# Patient Record
Sex: FEMALE | Race: WHITE | NOT HISPANIC OR LATINO | Employment: OTHER | URBAN - METROPOLITAN AREA
[De-identification: names, ages, dates, MRNs, and addresses within clinical notes are randomized per-mention and may not be internally consistent; named-entity substitution may affect disease eponyms.]

---

## 2023-04-20 ENCOUNTER — OFFICE VISIT (OUTPATIENT)
Dept: PRIMARY CARE | Facility: CLINIC | Age: 88
End: 2023-04-20
Payer: MEDICARE

## 2023-04-20 DIAGNOSIS — Z00.00 HEALTHCARE MAINTENANCE: ICD-10-CM

## 2023-04-20 DIAGNOSIS — I10 ESSENTIAL HYPERTENSION, BENIGN: Primary | ICD-10-CM

## 2023-04-20 PROCEDURE — UHSMG PR UH SELECT MEET AND GREET: Performed by: FAMILY MEDICINE

## 2023-04-20 RX ORDER — CHOLESTYRAMINE 4 G/9G
1 POWDER, FOR SUSPENSION ORAL 2 TIMES DAILY
COMMUNITY
Start: 2023-02-03 | End: 2023-06-08 | Stop reason: ALTCHOICE

## 2023-04-20 RX ORDER — FUROSEMIDE 20 MG/1
20 TABLET ORAL EVERY OTHER DAY
Qty: 45 TABLET | Refills: 2 | Status: SHIPPED | OUTPATIENT
Start: 2023-04-20 | End: 2023-12-07 | Stop reason: ALTCHOICE

## 2023-04-20 RX ORDER — ATENOLOL 50 MG/1
50 TABLET ORAL DAILY
Qty: 90 TABLET | Refills: 2 | Status: SHIPPED | OUTPATIENT
Start: 2023-04-20 | End: 2024-01-09 | Stop reason: SDUPTHER

## 2023-04-20 RX ORDER — LOSARTAN POTASSIUM AND HYDROCHLOROTHIAZIDE 25; 100 MG/1; MG/1
1 TABLET ORAL DAILY
COMMUNITY
Start: 2022-12-23 | End: 2023-04-20 | Stop reason: SDUPTHER

## 2023-04-20 RX ORDER — CHLORHEXIDINE GLUCONATE ORAL RINSE 1.2 MG/ML
5 SOLUTION DENTAL 2 TIMES DAILY
COMMUNITY
Start: 2022-11-04 | End: 2023-06-08 | Stop reason: ALTCHOICE

## 2023-04-20 RX ORDER — LOSARTAN POTASSIUM AND HYDROCHLOROTHIAZIDE 25; 100 MG/1; MG/1
1 TABLET ORAL DAILY
Qty: 90 TABLET | Refills: 2 | Status: SHIPPED | OUTPATIENT
Start: 2023-04-20 | End: 2024-01-09 | Stop reason: SDUPTHER

## 2023-04-20 RX ORDER — ATENOLOL 50 MG/1
50 TABLET ORAL DAILY
COMMUNITY
Start: 2022-12-23 | End: 2023-04-20 | Stop reason: SDUPTHER

## 2023-04-20 NOTE — PROGRESS NOTES
Here to discuss [facility]    Requesting refill on meds for which will be due prior to 1st visit    No allergy history

## 2023-05-25 ENCOUNTER — LAB (OUTPATIENT)
Dept: LAB | Facility: LAB | Age: 88
End: 2023-05-25
Payer: MEDICARE

## 2023-05-25 DIAGNOSIS — Z00.00 HEALTHCARE MAINTENANCE: ICD-10-CM

## 2023-05-25 LAB
ALANINE AMINOTRANSFERASE (SGPT) (U/L) IN SER/PLAS: 12 U/L (ref 7–45)
ALBUMIN (G/DL) IN SER/PLAS: 3.7 G/DL (ref 3.4–5)
ALKALINE PHOSPHATASE (U/L) IN SER/PLAS: 124 U/L (ref 33–136)
ANION GAP IN SER/PLAS: 13 MMOL/L (ref 10–20)
APPEARANCE, URINE: NORMAL
ASPARTATE AMINOTRANSFERASE (SGOT) (U/L) IN SER/PLAS: 16 U/L (ref 9–39)
BASOPHILS (10*3/UL) IN BLOOD BY AUTOMATED COUNT: 0.03 X10E9/L (ref 0–0.1)
BASOPHILS/100 LEUKOCYTES IN BLOOD BY AUTOMATED COUNT: 0.5 % (ref 0–2)
BILIRUBIN TOTAL (MG/DL) IN SER/PLAS: 0.7 MG/DL (ref 0–1.2)
BILIRUBIN, URINE: NEGATIVE
BLOOD, URINE: NEGATIVE
C REACTIVE PROTEIN (MG/L) IN SER/PLAS BY HIGH SENSIT: 0.5 MG/L
CALCIDIOL (25 OH VITAMIN D3) (NG/ML) IN SER/PLAS: 42 NG/ML
CALCIUM (MG/DL) IN SER/PLAS: 9.3 MG/DL (ref 8.6–10.6)
CARBON DIOXIDE, TOTAL (MMOL/L) IN SER/PLAS: 27 MMOL/L (ref 21–32)
CHLORIDE (MMOL/L) IN SER/PLAS: 104 MMOL/L (ref 98–107)
CHOLESTEROL (MG/DL) IN SER/PLAS: 149 MG/DL (ref 0–199)
CHOLESTEROL IN HDL (MG/DL) IN SER/PLAS: 51.7 MG/DL
CHOLESTEROL/HDL RATIO: 2.9
COLOR, URINE: YELLOW
CREATININE (MG/DL) IN SER/PLAS: 0.81 MG/DL (ref 0.5–1.05)
EOSINOPHILS (10*3/UL) IN BLOOD BY AUTOMATED COUNT: 0.38 X10E9/L (ref 0–0.4)
EOSINOPHILS/100 LEUKOCYTES IN BLOOD BY AUTOMATED COUNT: 6.7 % (ref 0–6)
ERYTHROCYTE DISTRIBUTION WIDTH (RATIO) BY AUTOMATED COUNT: 13.8 % (ref 11.5–14.5)
ERYTHROCYTE MEAN CORPUSCULAR HEMOGLOBIN CONCENTRATION (G/DL) BY AUTOMATED: 30.5 G/DL (ref 32–36)
ERYTHROCYTE MEAN CORPUSCULAR VOLUME (FL) BY AUTOMATED COUNT: 99 FL (ref 80–100)
ERYTHROCYTES (10*6/UL) IN BLOOD BY AUTOMATED COUNT: 4.42 X10E12/L (ref 4–5.2)
ESTIMATED AVERAGE GLUCOSE FOR HBA1C: 105 MG/DL
GFR FEMALE: 69 ML/MIN/1.73M2
GLUCOSE (MG/DL) IN SER/PLAS: 73 MG/DL (ref 74–99)
GLUCOSE, URINE: NEGATIVE MG/DL
HEMATOCRIT (%) IN BLOOD BY AUTOMATED COUNT: 43.9 % (ref 36–46)
HEMOGLOBIN (G/DL) IN BLOOD: 13.4 G/DL (ref 12–16)
HEMOGLOBIN A1C/HEMOGLOBIN TOTAL IN BLOOD: 5.3 %
IMMATURE GRANULOCYTES/100 LEUKOCYTES IN BLOOD BY AUTOMATED COUNT: 0.4 % (ref 0–0.9)
KETONES, URINE: NEGATIVE MG/DL
LDL: 85 MG/DL (ref 0–99)
LEUKOCYTE ESTERASE, URINE: NEGATIVE
LEUKOCYTES (10*3/UL) IN BLOOD BY AUTOMATED COUNT: 5.7 X10E9/L (ref 4.4–11.3)
LYMPHOCYTES (10*3/UL) IN BLOOD BY AUTOMATED COUNT: 1.6 X10E9/L (ref 0.8–3)
LYMPHOCYTES/100 LEUKOCYTES IN BLOOD BY AUTOMATED COUNT: 28.2 % (ref 13–44)
MONOCYTES (10*3/UL) IN BLOOD BY AUTOMATED COUNT: 0.68 X10E9/L (ref 0.05–0.8)
MONOCYTES/100 LEUKOCYTES IN BLOOD BY AUTOMATED COUNT: 12 % (ref 2–10)
NEUTROPHILS (10*3/UL) IN BLOOD BY AUTOMATED COUNT: 2.96 X10E9/L (ref 1.6–5.5)
NEUTROPHILS/100 LEUKOCYTES IN BLOOD BY AUTOMATED COUNT: 52.2 % (ref 40–80)
NITRITE, URINE: NEGATIVE
NRBC (PER 100 WBCS) BY AUTOMATED COUNT: 0 /100 WBC (ref 0–0)
PH, URINE: 6 (ref 5–8)
PLATELETS (10*3/UL) IN BLOOD AUTOMATED COUNT: 201 X10E9/L (ref 150–450)
POTASSIUM (MMOL/L) IN SER/PLAS: 3.7 MMOL/L (ref 3.5–5.3)
PROTEIN TOTAL: 6.5 G/DL (ref 6.4–8.2)
PROTEIN, URINE: NEGATIVE MG/DL
SODIUM (MMOL/L) IN SER/PLAS: 140 MMOL/L (ref 136–145)
SPECIFIC GRAVITY, URINE: 1.03 (ref 1–1.03)
THYROTROPIN (MIU/L) IN SER/PLAS BY DETECTION LIMIT <= 0.05 MIU/L: 2.26 MIU/L (ref 0.44–3.98)
TRIGLYCERIDE (MG/DL) IN SER/PLAS: 62 MG/DL (ref 0–149)
UREA NITROGEN (MG/DL) IN SER/PLAS: 23 MG/DL (ref 6–23)
UROBILINOGEN, URINE: <2 MG/DL (ref 0–1.9)
VLDL: 12 MG/DL (ref 0–40)

## 2023-05-25 PROCEDURE — 83036 HEMOGLOBIN GLYCOSYLATED A1C: CPT

## 2023-05-25 PROCEDURE — 86141 C-REACTIVE PROTEIN HS: CPT

## 2023-05-25 PROCEDURE — 84443 ASSAY THYROID STIM HORMONE: CPT

## 2023-05-25 PROCEDURE — 82306 VITAMIN D 25 HYDROXY: CPT

## 2023-05-25 PROCEDURE — 85025 COMPLETE CBC W/AUTO DIFF WBC: CPT

## 2023-05-25 PROCEDURE — 36415 COLL VENOUS BLD VENIPUNCTURE: CPT

## 2023-05-25 PROCEDURE — 80061 LIPID PANEL: CPT

## 2023-05-25 PROCEDURE — 80053 COMPREHEN METABOLIC PANEL: CPT

## 2023-05-25 PROCEDURE — 81003 URINALYSIS AUTO W/O SCOPE: CPT

## 2023-06-08 ENCOUNTER — OFFICE VISIT (OUTPATIENT)
Dept: PRIMARY CARE | Facility: CLINIC | Age: 88
End: 2023-06-08
Payer: MEDICARE

## 2023-06-08 VITALS
BODY MASS INDEX: 32.05 KG/M2 | DIASTOLIC BLOOD PRESSURE: 46 MMHG | WEIGHT: 204.2 LBS | TEMPERATURE: 98.3 F | HEIGHT: 67 IN | SYSTOLIC BLOOD PRESSURE: 92 MMHG | HEART RATE: 85 BPM | OXYGEN SATURATION: 90 %

## 2023-06-08 DIAGNOSIS — Z12.31 BREAST CANCER SCREENING BY MAMMOGRAM: ICD-10-CM

## 2023-06-08 DIAGNOSIS — K63.8219 SMALL INTESTINAL BACTERIAL OVERGROWTH (SIBO): ICD-10-CM

## 2023-06-08 DIAGNOSIS — I10 ESSENTIAL HYPERTENSION, BENIGN: ICD-10-CM

## 2023-06-08 DIAGNOSIS — Z00.01 ENCOUNTER FOR GENERAL ADULT MEDICAL EXAMINATION WITH ABNORMAL FINDINGS: Primary | ICD-10-CM

## 2023-06-08 PROCEDURE — 1036F TOBACCO NON-USER: CPT | Performed by: FAMILY MEDICINE

## 2023-06-08 PROCEDURE — 3078F DIAST BP <80 MM HG: CPT | Performed by: FAMILY MEDICINE

## 2023-06-08 PROCEDURE — 1159F MED LIST DOCD IN RCRD: CPT | Performed by: FAMILY MEDICINE

## 2023-06-08 PROCEDURE — 3074F SYST BP LT 130 MM HG: CPT | Performed by: FAMILY MEDICINE

## 2023-06-08 PROCEDURE — UHSPHYS PR UH SELECT PHYSICAL: Performed by: FAMILY MEDICINE

## 2023-06-08 PROCEDURE — 1126F AMNT PAIN NOTED NONE PRSNT: CPT | Performed by: FAMILY MEDICINE

## 2023-06-08 RX ORDER — ESOMEPRAZOLE MAGNESIUM 20 MG/1
1 TABLET, DELAYED RELEASE ORAL DAILY
COMMUNITY

## 2023-06-08 RX ORDER — MULTIVITAMIN
1 TABLET ORAL
COMMUNITY
Start: 2011-06-22 | End: 2024-01-09 | Stop reason: ALTCHOICE

## 2023-06-08 ASSESSMENT — PATIENT HEALTH QUESTIONNAIRE - PHQ9
1. LITTLE INTEREST OR PLEASURE IN DOING THINGS: NOT AT ALL
2. FEELING DOWN, DEPRESSED OR HOPELESS: NOT AT ALL
SUM OF ALL RESPONSES TO PHQ9 QUESTIONS 1 & 2: 0

## 2023-06-08 ASSESSMENT — ENCOUNTER SYMPTOMS
OCCASIONAL FEELINGS OF UNSTEADINESS: 0
LOSS OF SENSATION IN FEET: 0
DEPRESSION: 0

## 2023-06-08 ASSESSMENT — PAIN SCALES - GENERAL: PAINLEVEL: 0-NO PAIN

## 2023-06-08 ASSESSMENT — LIFESTYLE VARIABLES
SKIP TO QUESTIONS 9-10: 1
HOW OFTEN DO YOU HAVE SIX OR MORE DRINKS ON ONE OCCASION: NEVER
HOW OFTEN DO YOU HAVE A DRINK CONTAINING ALCOHOL: 2-4 TIMES A MONTH
HOW MANY STANDARD DRINKS CONTAINING ALCOHOL DO YOU HAVE ON A TYPICAL DAY: 1 OR 2
AUDIT-C TOTAL SCORE: 2

## 2023-06-09 ENCOUNTER — TELEPHONE (OUTPATIENT)
Dept: PRIMARY CARE | Facility: CLINIC | Age: 88
End: 2023-06-09
Payer: MEDICARE

## 2023-06-09 NOTE — TELEPHONE ENCOUNTER
Pt is calling in regards to getting Mamm but [facility] doesn't take the kind that your ordered and she is wondering if she should get it at [facility] or you would like her to have the one that you ordered at [facility]

## 2023-07-14 ENCOUNTER — TELEPHONE (OUTPATIENT)
Dept: PRIMARY CARE | Facility: CLINIC | Age: 88
End: 2023-07-14
Payer: MEDICARE

## 2023-07-14 NOTE — TELEPHONE ENCOUNTER
Had done breath test for GI issues - was tried on metronidazole and had rash that was covering her body from head to toe as noted - not bothering her as much since Tuesday - no fever / feels fine - the lesions do yair - will have her try OTC zyrtec every day and consider BID if need be

## 2023-07-14 NOTE — TELEPHONE ENCOUNTER
Patient c/o widespread rash  Little red dots starting on the tops of her feet to her face  Mild itching  Denies pain, burning, blistering, bleeding  Denies warmth  Denies any new foods, soaps, detergents or lotion  Denies fever

## 2023-09-07 ENCOUNTER — OFFICE VISIT (OUTPATIENT)
Dept: PRIMARY CARE | Facility: CLINIC | Age: 88
End: 2023-09-07
Payer: MEDICARE

## 2023-09-07 VITALS
SYSTOLIC BLOOD PRESSURE: 118 MMHG | OXYGEN SATURATION: 95 % | HEART RATE: 73 BPM | BODY MASS INDEX: 32.27 KG/M2 | TEMPERATURE: 98 F | DIASTOLIC BLOOD PRESSURE: 48 MMHG | WEIGHT: 204.4 LBS

## 2023-09-07 DIAGNOSIS — G47.9 SLEEP DISTURBANCE: ICD-10-CM

## 2023-09-07 DIAGNOSIS — Z23 FLU VACCINE NEED: ICD-10-CM

## 2023-09-07 DIAGNOSIS — I10 ESSENTIAL HYPERTENSION, BENIGN: Primary | ICD-10-CM

## 2023-09-07 DIAGNOSIS — R19.7 DIARRHEA, UNSPECIFIED TYPE: ICD-10-CM

## 2023-09-07 PROCEDURE — 3078F DIAST BP <80 MM HG: CPT | Performed by: FAMILY MEDICINE

## 2023-09-07 PROCEDURE — 99214 OFFICE O/P EST MOD 30 MIN: CPT | Performed by: FAMILY MEDICINE

## 2023-09-07 PROCEDURE — 90662 IIV NO PRSV INCREASED AG IM: CPT | Performed by: FAMILY MEDICINE

## 2023-09-07 PROCEDURE — 1126F AMNT PAIN NOTED NONE PRSNT: CPT | Performed by: FAMILY MEDICINE

## 2023-09-07 PROCEDURE — 1036F TOBACCO NON-USER: CPT | Performed by: FAMILY MEDICINE

## 2023-09-07 PROCEDURE — 3074F SYST BP LT 130 MM HG: CPT | Performed by: FAMILY MEDICINE

## 2023-09-07 PROCEDURE — 1159F MED LIST DOCD IN RCRD: CPT | Performed by: FAMILY MEDICINE

## 2023-09-07 PROCEDURE — G0008 ADMIN INFLUENZA VIRUS VAC: HCPCS | Performed by: FAMILY MEDICINE

## 2023-09-07 RX ORDER — DOXYCYCLINE 100 MG/1
100 CAPSULE ORAL 2 TIMES DAILY
COMMUNITY
Start: 2023-09-06 | End: 2023-12-07 | Stop reason: ALTCHOICE

## 2023-09-07 ASSESSMENT — PAIN SCALES - GENERAL: PAINLEVEL: 0-NO PAIN

## 2023-09-07 NOTE — PROGRESS NOTES
"Subjective   Patient ID: Karyn Bliss is a 89 y.o. female who presents for Blood Pressure Check.  HPI  1) Here to follow-up hypertension - denies chest pain, shortness of breath, dizziness, lightheadedness, hand or foot swelling that is new or different.  Taking and tolerating medications well -she is no longer taking a diuretic    2) ongoing issues with diarrhea/looser stool  Is working with Dr. Baum on this  Did have a positive \"intolerance test\"  She is now going to do a trial of doxycycline, had been on metronidazole which was really not agreeing with her  Was suggested she could get by with just taking Imodium   Does not report any fever or blood in stool or dark stool    3) requests disability placard letter    4) sleep issues  Does take a acetaminophen/diphenhydramine product (500/25) which she finds helps get her through the night better  Does not necessarily help her fall asleep  Did not report any daytime fatigue or other complications/problems    Review of Systems  Essentially noncontributory otherwise      Objective   BP (!) 118/48 (BP Location: Left arm, Patient Position: Sitting, BP Cuff Size: Large adult)   Pulse 73   Temp 36.7 °C (98 °F) (Temporal)   Wt 92.7 kg (204 lb 6.4 oz)   SpO2 95%   BMI 32.27 kg/m²     Physical Exam  General: No acute distress, appears well  HEENT: No scleral icterus or conjunctival injection  Neck: No gross JVD or bruits appreciated  Cardiac: Regular rate and rhythm, normal S1 and S2 without S3-S4 or murmur  Lungs: Clear to auscultation bilaterally with good air exchange  Abdomen: Normal active bowel sounds, no marked tenderness to touch, no HSM  Extremities: Appear well-perfused without significant edema  Neuro: Ambulates well without assistance device  Assessment/Plan   Problem List Items Addressed This Visit    None  Visit Diagnoses       Essential hypertension, benign    -  Primary    Diarrhea, unspecified type        Sleep disturbance            1) doing " generally well, continue current medications/supplements    2) she questions about vitamin D, after further discussion regarding more correlated than causative findings when studied she will hold off on taking for now     3) given letter for disability placard    4) prefers keeping a close eye on things and we will see her in 3 months

## 2023-09-16 NOTE — PROGRESS NOTES
"Subjective   Patient ID: Karyn Bliss is a 89 y.o. female who presents for Annual Exam ([facility]).  HPI  1) Has had some bowel issues that improved since stopping dairy  Had done some breath testing through GI then was tried on metronidazole for apparent SIBO -then developed significant rash all over and had to stop  No blood in stool or dark tarry stool    2) would like order for mammogram    3) Here to follow-up hypertension - denies chest pain, shortness of breath, dizziness, lightheadedness, hand or foot swelling that is new or different.  Taking and tolerating medications well.      Recent lab profile very favorable      Review of Systems  Essentially noncontributory otherwise    Objective   BP (!) 92/46 (BP Location: Left arm, Patient Position: Sitting, BP Cuff Size: Adult)   Pulse 85   Temp 36.8 °C (98.3 °F) (Temporal)   Ht 1.695 m (5' 6.73\")   Wt 92.6 kg (204 lb 3.2 oz)   SpO2 90%   BMI 32.24 kg/m²       Physical Exam  Vitals and nursing note reviewed.   Constitutional:       General: She is not in acute distress.     Appearance: She is not ill-appearing.   HENT:      Head: Normocephalic and atraumatic.      Mouth/Throat:      Pharynx: No posterior oropharyngeal erythema.   Eyes:      General: No scleral icterus.     Extraocular Movements: Extraocular movements intact.      Pupils: Pupils are equal, round, and reactive to light.   Cardiovascular:      Rate and Rhythm: Normal rate and regular rhythm.      Heart sounds: No murmur heard.  Pulmonary:      Breath sounds: Normal breath sounds. No wheezing or rhonchi.   Abdominal:      General: There is no distension.      Palpations: Abdomen is soft.      Tenderness: There is no abdominal tenderness.   Musculoskeletal:         General: Normal range of motion.      Cervical back: Normal range of motion.      Right lower leg: No edema.      Left lower leg: No edema.   Lymphadenopathy:      Cervical: No cervical adenopathy.   Skin:     General: Skin is " warm and dry.   Neurological:      Mental Status: She is alert and oriented to person, place, and time. Mental status is at baseline.      Motor: No weakness.      Coordination: Coordination normal.      Deep Tendon Reflexes: Reflexes normal.   Psychiatric:         Mood and Affect: Mood normal.         Behavior: Behavior normal.         Thought Content: Thought content normal.         Judgment: Judgment normal.         Assessment/Plan   Problem List Items Addressed This Visit       Small intestinal bacterial overgrowth (SIBO)    Essential hypertension, benign     Other Visit Diagnoses       Encounter for general adult medical examination with abnormal findings    -  Primary    Breast cancer screening by mammogram            Doing very well overall - plans to do q3m check ins

## 2023-09-17 PROBLEM — I10 ESSENTIAL HYPERTENSION, BENIGN: Status: ACTIVE | Noted: 2023-09-17

## 2023-09-17 PROBLEM — K63.8219 SMALL INTESTINAL BACTERIAL OVERGROWTH (SIBO): Status: ACTIVE | Noted: 2023-09-17

## 2023-10-27 ENCOUNTER — TELEPHONE (OUTPATIENT)
Dept: GASTROENTEROLOGY | Facility: CLINIC | Age: 88
End: 2023-10-27
Payer: MEDICARE

## 2023-10-27 DIAGNOSIS — K63.8219 SMALL INTESTINAL BACTERIAL OVERGROWTH (SIBO): Primary | ICD-10-CM

## 2023-10-27 RX ORDER — CEPHALEXIN 500 MG/1
500 CAPSULE ORAL 2 TIMES DAILY
Qty: 14 CAPSULE | Refills: 0 | Status: SHIPPED | OUTPATIENT
Start: 2023-10-27 | End: 2023-11-03

## 2023-10-27 NOTE — TELEPHONE ENCOUNTER
Pt had been feeling better but this morning she experienced explosive diarrhea.  She would like to speak with you.

## 2023-10-27 NOTE — TELEPHONE ENCOUNTER
For a month after taking Bactrim for a week she was fine and then this morning had the kind of explosive diarrhea she was having previously.    We will give Keflex for 1 week.  She will let me know.    May need to consider regular rotating antibiotics.

## 2023-12-07 ENCOUNTER — OFFICE VISIT (OUTPATIENT)
Dept: PRIMARY CARE | Facility: CLINIC | Age: 88
End: 2023-12-07
Payer: MEDICARE

## 2023-12-07 VITALS
SYSTOLIC BLOOD PRESSURE: 113 MMHG | OXYGEN SATURATION: 99 % | TEMPERATURE: 97.9 F | HEART RATE: 71 BPM | DIASTOLIC BLOOD PRESSURE: 54 MMHG

## 2023-12-07 DIAGNOSIS — I10 ESSENTIAL HYPERTENSION, BENIGN: Primary | ICD-10-CM

## 2023-12-07 PROCEDURE — 1159F MED LIST DOCD IN RCRD: CPT | Performed by: FAMILY MEDICINE

## 2023-12-07 PROCEDURE — 1126F AMNT PAIN NOTED NONE PRSNT: CPT | Performed by: FAMILY MEDICINE

## 2023-12-07 PROCEDURE — 1036F TOBACCO NON-USER: CPT | Performed by: FAMILY MEDICINE

## 2023-12-07 PROCEDURE — 3078F DIAST BP <80 MM HG: CPT | Performed by: FAMILY MEDICINE

## 2023-12-07 PROCEDURE — 1160F RVW MEDS BY RX/DR IN RCRD: CPT | Performed by: FAMILY MEDICINE

## 2023-12-07 PROCEDURE — 3074F SYST BP LT 130 MM HG: CPT | Performed by: FAMILY MEDICINE

## 2023-12-07 PROCEDURE — 99214 OFFICE O/P EST MOD 30 MIN: CPT | Performed by: FAMILY MEDICINE

## 2023-12-07 ASSESSMENT — PAIN SCALES - GENERAL: PAINLEVEL: 0-NO PAIN

## 2023-12-07 NOTE — PROGRESS NOTES
Subjective   Patient ID: Karyn Bliss is a 89 y.o. female who presents for Follow-up (3 mo FUV - HTN).  HPI  1) Here to follow-up hypertension - denies chest pain, shortness of breath, dizziness, lightheadedness, hand or foot swelling that is new or different.  Taking and tolerating medications well.  Doing well with physical activity. Bps low today - no hypotensive sxs.      Review of Systems  Essentially noncontributory otherwise    Objective   /54   Pulse 71   Temp 36.6 °C (97.9 °F) (Temporal)   SpO2 99%     Physical Exam  General: No acute distress, appears at baseline  Neck: No gross JVD or thyromegaly  Cardiac: Regular rhythm, normal S1 and S2 without S3 or S4 but 2 out of 6 systolic ejection murmur heard best at left sternal border  Lungs: Clear to auscultation bilaterally  Extremities: Appear well-perfused - slight nonpitting edema left ankle > right  Assessment/Plan   Problem List Items Addressed This Visit       Essential hypertension, benign - Primary   Given low BP - will reduce Atenolol to 25 mg - follow-up next month to reassess

## 2024-01-09 ENCOUNTER — OFFICE VISIT (OUTPATIENT)
Dept: PRIMARY CARE | Facility: CLINIC | Age: 89
End: 2024-01-09
Payer: MEDICARE

## 2024-01-09 VITALS
BODY MASS INDEX: 32.84 KG/M2 | TEMPERATURE: 98.5 F | SYSTOLIC BLOOD PRESSURE: 113 MMHG | HEART RATE: 65 BPM | OXYGEN SATURATION: 96 % | WEIGHT: 208 LBS | DIASTOLIC BLOOD PRESSURE: 74 MMHG

## 2024-01-09 DIAGNOSIS — R25.2 MUSCLE CRAMPS: ICD-10-CM

## 2024-01-09 DIAGNOSIS — K63.8219 SMALL INTESTINAL BACTERIAL OVERGROWTH (SIBO): ICD-10-CM

## 2024-01-09 DIAGNOSIS — I10 ESSENTIAL HYPERTENSION, BENIGN: Primary | ICD-10-CM

## 2024-01-09 PROCEDURE — 3074F SYST BP LT 130 MM HG: CPT | Performed by: FAMILY MEDICINE

## 2024-01-09 PROCEDURE — 1126F AMNT PAIN NOTED NONE PRSNT: CPT | Performed by: FAMILY MEDICINE

## 2024-01-09 PROCEDURE — 1159F MED LIST DOCD IN RCRD: CPT | Performed by: FAMILY MEDICINE

## 2024-01-09 PROCEDURE — 1036F TOBACCO NON-USER: CPT | Performed by: FAMILY MEDICINE

## 2024-01-09 PROCEDURE — 3078F DIAST BP <80 MM HG: CPT | Performed by: FAMILY MEDICINE

## 2024-01-09 PROCEDURE — 99215 OFFICE O/P EST HI 40 MIN: CPT | Performed by: FAMILY MEDICINE

## 2024-01-09 RX ORDER — ATENOLOL 25 MG/1
25 TABLET ORAL DAILY
Qty: 90 TABLET | Refills: 3 | Status: SHIPPED | OUTPATIENT
Start: 2024-01-09

## 2024-01-09 RX ORDER — FUROSEMIDE 20 MG/1
20 TABLET ORAL DAILY PRN
Qty: 90 TABLET | Refills: 1 | COMMUNITY
Start: 2024-01-09

## 2024-01-09 RX ORDER — LOSARTAN POTASSIUM AND HYDROCHLOROTHIAZIDE 25; 100 MG/1; MG/1
1 TABLET ORAL DAILY
Qty: 90 TABLET | Refills: 3 | Status: SHIPPED | OUTPATIENT
Start: 2024-01-09

## 2024-01-09 ASSESSMENT — PAIN SCALES - GENERAL: PAINLEVEL: 0-NO PAIN

## 2024-01-09 NOTE — PROGRESS NOTES
Subjective   Patient ID: Karyn Bliss is a 89 y.o. female who presents for Hypertension (FUV).  HPI  Here to follow-up hypertension - denies chest pain, shortness of breath, dizziness, lightheadedness, hand or foot swelling that is new or different.  Taking and tolerating medications well.       Still getting abdominal pain and diarrhea issues - followed by Dr Baum - had positive breath test (SIBO) - trying different meds     Getting hand and leg cramps - ~1x/week - variable - no clear precipitant - more problematic when in thigh, can make hard to stand -     Review of Systems  Essentially noncontributory otherwise    Objective   /74 (BP Location: Left arm)   Pulse 68   Temp 36.9 °C (98.5 °F) (Temporal)   Wt 94.3 kg (208 lb)   SpO2 96%   BMI 32.84 kg/m²     Physical Exam  General: No acute distress, appears at baseline  Lungs: Clear to auscultation bilaterally no wheeze or crackles  Cardiac: Regular rate and rhythm, normal S1-S2 without S3-S4  Extremities: Appear well-perfused, no marked lower extremity edema, no obvious crating issues at present  Assessment/Plan   Problem List Items Addressed This Visit       Small intestinal bacterial overgrowth (SIBO)    Essential hypertension, benign - Primary     Other Visit Diagnoses       Muscle cramps            1) HTN - stable - cpm - will et back to me about meds needed    2) Muscle cramps - sent email re. [employer] Medi-Lyte Heat Stress Relief (Ca++, Mg++, K+)  and suggest can try daily bit at least on days when takes diuretic     3) SIBO - cont to follow w/ GI     TVT of 44 minutes with greater than 50% spent FTF in assessment/discussion and care coordination

## 2024-01-17 ENCOUNTER — TELEPHONE (OUTPATIENT)
Dept: GASTROENTEROLOGY | Facility: CLINIC | Age: 89
End: 2024-01-17
Payer: MEDICARE

## 2024-01-17 NOTE — TELEPHONE ENCOUNTER
Mrs. Bliss would like to speak with you concerning her diarrhea and the medication, she has been taking for it.

## 2024-01-17 NOTE — TELEPHONE ENCOUNTER
"Last round of antibiotics did not really help her.  She will usually have a formed stool then a softer stool and sometimes loose stool in the morning and less she takes an Imodium after the first stool in which case it will usually stop.  When she is going out she takes a second Imodium \"just to be safe\" and then is okay but she sometimes has no bowel movement the following day.  This has been an effective regimen for her.  In view of her lack of response to the last couple antibiotic trials with her ongoing SIBO, told her that if she is comfortable with the lifestyle management the way she is currently doing it with Imodium I think that is a reasonable long-term solution and preferable to repeated courses of antibiotics which have had a diminishing benefit.  "

## 2024-02-01 DIAGNOSIS — Z00.00 HEALTHCARE MAINTENANCE: ICD-10-CM

## 2024-02-14 ENCOUNTER — TELEPHONE (OUTPATIENT)
Dept: PRIMARY CARE | Facility: CLINIC | Age: 89
End: 2024-02-14
Payer: MEDICARE

## 2024-02-14 NOTE — TELEPHONE ENCOUNTER
Patient notes that she was feeling quite dizzy for a few hours last night.   She states that you have been adjusting her BP meds and she thinks it is related.   The dizziness has resolved, however she is concerned.  She would like to speak with you.

## 2024-02-29 NOTE — TELEPHONE ENCOUNTER
"Called to see if any other episodes - none noted   Her \"dizziness\" spell was really more so vertigo - would follow - enc to reach out to us if it happens again - consider home BPPV exercises     "

## 2024-04-09 ENCOUNTER — OFFICE VISIT (OUTPATIENT)
Dept: PRIMARY CARE | Facility: CLINIC | Age: 89
End: 2024-04-09
Payer: MEDICARE

## 2024-04-09 VITALS
TEMPERATURE: 98.2 F | WEIGHT: 212.2 LBS | HEART RATE: 80 BPM | SYSTOLIC BLOOD PRESSURE: 123 MMHG | BODY MASS INDEX: 33.3 KG/M2 | OXYGEN SATURATION: 96 % | HEIGHT: 67 IN | DIASTOLIC BLOOD PRESSURE: 60 MMHG

## 2024-04-09 DIAGNOSIS — M79.673 PAIN OF FOOT, UNSPECIFIED LATERALITY: ICD-10-CM

## 2024-04-09 DIAGNOSIS — I10 ESSENTIAL HYPERTENSION, BENIGN: Primary | ICD-10-CM

## 2024-04-09 DIAGNOSIS — R25.2 MUSCLE CRAMPS: ICD-10-CM

## 2024-04-09 DIAGNOSIS — R19.7 DIARRHEA, UNSPECIFIED TYPE: ICD-10-CM

## 2024-04-09 PROCEDURE — 3074F SYST BP LT 130 MM HG: CPT | Performed by: FAMILY MEDICINE

## 2024-04-09 PROCEDURE — 1126F AMNT PAIN NOTED NONE PRSNT: CPT | Performed by: FAMILY MEDICINE

## 2024-04-09 PROCEDURE — 99215 OFFICE O/P EST HI 40 MIN: CPT | Performed by: FAMILY MEDICINE

## 2024-04-09 PROCEDURE — 1036F TOBACCO NON-USER: CPT | Performed by: FAMILY MEDICINE

## 2024-04-09 PROCEDURE — 3078F DIAST BP <80 MM HG: CPT | Performed by: FAMILY MEDICINE

## 2024-04-09 PROCEDURE — 1159F MED LIST DOCD IN RCRD: CPT | Performed by: FAMILY MEDICINE

## 2024-04-09 ASSESSMENT — PAIN SCALES - GENERAL: PAINLEVEL: 0-NO PAIN

## 2024-04-09 NOTE — PROGRESS NOTES
"Subjective   Patient ID: Karyn Bliss \"Pranay" is a 89 y.o. female who presents for Follow-up (3 month FUV).  HPI  Has been on the electrolytes - helping with muscle cramps (less so hand cramps) - no excruciating cramps in legs like before - diarrhea issues are 70-80% better however - has had sig less need for loperamide - no new GI complaints but adds that she can get some right upper quadrant pain mostly precipitated by movement of some sort, she questions if may be adhesions, however did have a scan to investigate that problem and is learning to navigate through it when these episodes occur as they are relatively short-lived    Stopped the Tylenol PM - found it really helped a little with reestablishing with sleep -I had given her the National sleep foundation recommendations in the past, she is thoughtful of them    Foot pain - bilaterally -has worked with provider in Dr. Chirinos's office -she had been given some orthoses in the past but they were simply too bulky -has been told she has lost significant in the fat from the bottom of her foot - has been limiting for her    Here to follow-up hypertension - denies chest pain, shortness of breath, dizziness, lightheadedness, hand or foot swelling that is new or different.  Taking and tolerating medications well.  She has rarely taken the diuretic -she does seem to tire relatively easily but after resting can get going again, feels its more of a back/muscular issue than anything cardiac    Review of Systems  Essentially noncontributory otherwise    Objective   /60 (BP Location: Left arm, Patient Position: Sitting, BP Cuff Size: Large adult)   Pulse 80   Temp 36.8 °C (98.2 °F) (Temporal)   Ht 1.695 m (5' 6.73\")   Wt 96.3 kg (212 lb 3.2 oz)   SpO2 96%   BMI 33.50 kg/m²     Physical Exam  General: No acute distress, appears at baseline  HEENT: No conjunctival injection or scleral icterus  Neck: No gross JVD or thyromegaly  Lungs: Clear to auscultation " bilaterally no wheeze or crackles  Cardiac: Regular rate and rhythm, occasional early beat, normal S1-S2 without S3-S4 or marked murmur  Extremities: Appears to move all well, ambulates without assistance/cane, no lower extremity pitting edema  Assessment/Plan   Problem List Items Addressed This Visit       Essential hypertension, benign - Primary     Other Visit Diagnoses       Muscle cramps        Diarrhea, unspecified type        Pain of foot, unspecified laterality            1) muscle cramps and diarrhea: Both improved significantly with use of electrolyte packet, plans to experiment with increasing to 2 packets a day and titrating as necessary    2) foot pain -recommend visiting [facility] to see what they might have to offer her so she can get back to some walking    3) sleep struggles -managing this okay at present time    4) HTN issues - stable - cpm

## 2024-04-18 DIAGNOSIS — Z00.00 HEALTHCARE MAINTENANCE: ICD-10-CM

## 2024-05-20 ENCOUNTER — TELEPHONE (OUTPATIENT)
Dept: PRIMARY CARE | Facility: CLINIC | Age: 89
End: 2024-05-20
Payer: MEDICARE

## 2024-05-20 DIAGNOSIS — J01.90 ACUTE SINUSITIS, RECURRENCE NOT SPECIFIED, UNSPECIFIED LOCATION: Primary | ICD-10-CM

## 2024-05-20 RX ORDER — DOXYCYCLINE 100 MG/1
100 TABLET ORAL 2 TIMES DAILY
Qty: 14 TABLET | Refills: 0 | Status: SHIPPED | OUTPATIENT
Start: 2024-05-20 | End: 2024-05-27

## 2024-05-20 NOTE — TELEPHONE ENCOUNTER
As noted - in her head and ear - no fever/chills  Taking an OTC medicine that helped dry things   Feel some mucous in her throat - not able to expectorate it though  Pressure in sinuses - some drainage - some pain in L ear   Did have COVID in the remote past   More fatigue today  Discussed symptom are with Afrin or Sinex  But given duration of symptoms be reasonable to try the following which is what she prefers  Requested Prescriptions     Signed Prescriptions Disp Refills    doxycycline (Adoxa) 100 mg tablet 14 tablet 0     Sig: Take 1 tablet (100 mg) by mouth 2 times a day for 7 days. Take with a full glass of water     Authorizing Provider: RICK ESCOBAR       Expect will follow-up in 48-72 hours or sooner if worsening/concerns

## 2024-05-20 NOTE — TELEPHONE ENCOUNTER
Pt states that she has been sick since last Wednesday with a pretty bad cold.     She complains of dry cough, congestion, left earache and fatigue.  She states she just has no energy and doesn't feel well.  She did test negative for COVID x 2.  She denies fever.  She would like to speak with you.

## 2024-05-28 ENCOUNTER — TELEPHONE (OUTPATIENT)
Dept: PRIMARY CARE | Facility: CLINIC | Age: 89
End: 2024-05-28
Payer: MEDICARE

## 2024-05-28 NOTE — TELEPHONE ENCOUNTER
Patient treated with antibiotics for URI last week.   She is feeling much better but continues to have problems with her left ear and states it is totally blocked - she cannot hear out of it at all.  She is looking for additional guidance.

## 2024-05-28 NOTE — TELEPHONE ENCOUNTER
Patient and I spoke.    Patient treated with doxycycline on 5/20 for acute sinusitis.  Nasal congestion has cleared.  At that time, patient had left ear pain which has resolved.  However, patient has persistent decreased hearing in left ear.  She denies any history of cerumen, does use hearing aids but not currently wearing.  No fever or chills.  Has used over-the-counter Coricidin, no other symptomatic treatment.    Assessment:  Acute sinusitis, improved  Persistent left hearing loss, possible eustachian tube dysfunction    Plan:  Trial of Flonase, 2 sprays in each nostril at bedtime  Update office in 2 to 3 days.    Alexsander Teixeira MD

## 2024-06-03 ENCOUNTER — TELEPHONE (OUTPATIENT)
Dept: PRIMARY CARE | Facility: CLINIC | Age: 89
End: 2024-06-03

## 2024-06-03 ENCOUNTER — LAB (OUTPATIENT)
Dept: LAB | Facility: LAB | Age: 89
End: 2024-06-03
Payer: MEDICARE

## 2024-06-03 DIAGNOSIS — Z00.00 HEALTHCARE MAINTENANCE: ICD-10-CM

## 2024-06-03 DIAGNOSIS — H69.92 EUSTACHIAN TUBE DISORDER, LEFT: Primary | ICD-10-CM

## 2024-06-03 LAB
25(OH)D3 SERPL-MCNC: 35 NG/ML (ref 30–100)
ALBUMIN SERPL BCP-MCNC: 4 G/DL (ref 3.4–5)
ALP SERPL-CCNC: 126 U/L (ref 33–136)
ALT SERPL W P-5'-P-CCNC: 16 U/L (ref 7–45)
ANION GAP SERPL CALC-SCNC: 12 MMOL/L (ref 10–20)
APPEARANCE UR: CLEAR
AST SERPL W P-5'-P-CCNC: 18 U/L (ref 9–39)
BASOPHILS # BLD AUTO: 0.05 X10*3/UL (ref 0–0.1)
BASOPHILS NFR BLD AUTO: 0.7 %
BILIRUB SERPL-MCNC: 0.7 MG/DL (ref 0–1.2)
BILIRUB UR STRIP.AUTO-MCNC: NEGATIVE MG/DL
BUN SERPL-MCNC: 25 MG/DL (ref 6–23)
CALCIUM SERPL-MCNC: 9.6 MG/DL (ref 8.6–10.6)
CHLORIDE SERPL-SCNC: 102 MMOL/L (ref 98–107)
CHOLEST SERPL-MCNC: 155 MG/DL (ref 0–199)
CHOLESTEROL/HDL RATIO: 2.7
CO2 SERPL-SCNC: 27 MMOL/L (ref 21–32)
COLOR UR: YELLOW
CREAT SERPL-MCNC: 0.89 MG/DL (ref 0.5–1.05)
CRP SERPL HS-MCNC: 0.6 MG/L
EGFRCR SERPLBLD CKD-EPI 2021: 62 ML/MIN/1.73M*2
EOSINOPHIL # BLD AUTO: 0.35 X10*3/UL (ref 0–0.4)
EOSINOPHIL NFR BLD AUTO: 4.7 %
ERYTHROCYTE [DISTWIDTH] IN BLOOD BY AUTOMATED COUNT: 12.9 % (ref 11.5–14.5)
EST. AVERAGE GLUCOSE BLD GHB EST-MCNC: 103 MG/DL
GLUCOSE SERPL-MCNC: 91 MG/DL (ref 74–99)
GLUCOSE UR STRIP.AUTO-MCNC: NORMAL MG/DL
HBA1C MFR BLD: 5.2 %
HCT VFR BLD AUTO: 41.1 % (ref 36–46)
HDLC SERPL-MCNC: 56.8 MG/DL
HGB BLD-MCNC: 13.2 G/DL (ref 12–16)
HOLD SPECIMEN: NORMAL
IMM GRANULOCYTES # BLD AUTO: 0.01 X10*3/UL (ref 0–0.5)
IMM GRANULOCYTES NFR BLD AUTO: 0.1 % (ref 0–0.9)
KETONES UR STRIP.AUTO-MCNC: NEGATIVE MG/DL
LDLC SERPL CALC-MCNC: 84 MG/DL
LEUKOCYTE ESTERASE UR QL STRIP.AUTO: ABNORMAL
LYMPHOCYTES # BLD AUTO: 2.02 X10*3/UL (ref 0.8–3)
LYMPHOCYTES NFR BLD AUTO: 27 %
MCH RBC QN AUTO: 30.6 PG (ref 26–34)
MCHC RBC AUTO-ENTMCNC: 32.1 G/DL (ref 32–36)
MCV RBC AUTO: 95 FL (ref 80–100)
MONOCYTES # BLD AUTO: 0.76 X10*3/UL (ref 0.05–0.8)
MONOCYTES NFR BLD AUTO: 10.1 %
MUCOUS THREADS #/AREA URNS AUTO: NORMAL /LPF
NEUTROPHILS # BLD AUTO: 4.3 X10*3/UL (ref 1.6–5.5)
NEUTROPHILS NFR BLD AUTO: 57.4 %
NITRITE UR QL STRIP.AUTO: NEGATIVE
NON HDL CHOLESTEROL: 98 MG/DL (ref 0–149)
NRBC BLD-RTO: 0 /100 WBCS (ref 0–0)
PH UR STRIP.AUTO: 6.5 [PH]
PLATELET # BLD AUTO: 222 X10*3/UL (ref 150–450)
POTASSIUM SERPL-SCNC: 4.2 MMOL/L (ref 3.5–5.3)
PROT SERPL-MCNC: 6.5 G/DL (ref 6.4–8.2)
PROT UR STRIP.AUTO-MCNC: NEGATIVE MG/DL
RBC # BLD AUTO: 4.31 X10*6/UL (ref 4–5.2)
RBC # UR STRIP.AUTO: NEGATIVE /UL
RBC #/AREA URNS AUTO: NORMAL /HPF
SODIUM SERPL-SCNC: 137 MMOL/L (ref 136–145)
SP GR UR STRIP.AUTO: 1.02
SQUAMOUS #/AREA URNS AUTO: NORMAL /HPF
TRIGL SERPL-MCNC: 70 MG/DL (ref 0–149)
TSH SERPL-ACNC: 1.78 MIU/L (ref 0.44–3.98)
UROBILINOGEN UR STRIP.AUTO-MCNC: NORMAL MG/DL
VLDL: 14 MG/DL (ref 0–40)
WBC # BLD AUTO: 7.5 X10*3/UL (ref 4.4–11.3)
WBC #/AREA URNS AUTO: NORMAL /HPF

## 2024-06-03 PROCEDURE — 85025 COMPLETE CBC W/AUTO DIFF WBC: CPT

## 2024-06-03 PROCEDURE — 83036 HEMOGLOBIN GLYCOSYLATED A1C: CPT

## 2024-06-03 PROCEDURE — 84443 ASSAY THYROID STIM HORMONE: CPT

## 2024-06-03 PROCEDURE — 80053 COMPREHEN METABOLIC PANEL: CPT

## 2024-06-03 PROCEDURE — 87086 URINE CULTURE/COLONY COUNT: CPT

## 2024-06-03 PROCEDURE — 86141 C-REACTIVE PROTEIN HS: CPT

## 2024-06-03 PROCEDURE — 81001 URINALYSIS AUTO W/SCOPE: CPT

## 2024-06-03 PROCEDURE — 82306 VITAMIN D 25 HYDROXY: CPT

## 2024-06-03 PROCEDURE — 36415 COLL VENOUS BLD VENIPUNCTURE: CPT

## 2024-06-03 PROCEDURE — 80061 LIPID PANEL: CPT

## 2024-06-03 RX ORDER — PREDNISONE 10 MG/1
TABLET ORAL
Qty: 20 TABLET | Refills: 0 | Status: SHIPPED | OUTPATIENT
Start: 2024-06-03 | End: 2024-06-11 | Stop reason: HOSPADM

## 2024-06-03 NOTE — TELEPHONE ENCOUNTER
Pt states that she has used drops and spray as you suggested - she is still barely able to hear out of her left ear.  She is questioning next steps?  She has a physical with you next week.  She would like to know what she can before then?    Do you want to refer to [facility]?

## 2024-06-03 NOTE — TELEPHONE ENCOUNTER
Hard to hear out of left ear   No pain  Head is clearer though -has taken oral antibiotic, nasal decongestant, and nasal steroid, will do a short course of an oral steroid and consider need for ENT      Requested Prescriptions     Signed Prescriptions Disp Refills    predniSONE (Deltasone) 10 mg tablet 20 tablet 0     Sig: Take up to 4 tablets a day for up to 5 days     Authorizing Provider: RICK ESCOBAR

## 2024-06-04 LAB — BACTERIA UR CULT: NORMAL

## 2024-06-10 ENCOUNTER — OFFICE VISIT (OUTPATIENT)
Dept: PRIMARY CARE | Facility: CLINIC | Age: 89
End: 2024-06-10
Payer: MEDICARE

## 2024-06-10 VITALS
HEIGHT: 67 IN | HEART RATE: 70 BPM | WEIGHT: 208 LBS | SYSTOLIC BLOOD PRESSURE: 154 MMHG | DIASTOLIC BLOOD PRESSURE: 81 MMHG | BODY MASS INDEX: 32.65 KG/M2 | OXYGEN SATURATION: 96 % | TEMPERATURE: 98.3 F

## 2024-06-10 DIAGNOSIS — Z90.722 ACQUIRED ABSENCE OF OVARIES, BILATERAL: ICD-10-CM

## 2024-06-10 DIAGNOSIS — M85.80 OSTEOPENIA, UNSPECIFIED LOCATION: ICD-10-CM

## 2024-06-10 DIAGNOSIS — Z00.01 ENCOUNTER FOR GENERAL ADULT MEDICAL EXAMINATION WITH ABNORMAL FINDINGS: Primary | ICD-10-CM

## 2024-06-10 DIAGNOSIS — I10 ESSENTIAL HYPERTENSION, BENIGN: ICD-10-CM

## 2024-06-10 DIAGNOSIS — L98.9 BENIGN SKIN LESION: ICD-10-CM

## 2024-06-10 DIAGNOSIS — R19.7 DIARRHEA, UNSPECIFIED TYPE: ICD-10-CM

## 2024-06-10 DIAGNOSIS — H91.92 HEARING LOSS OF LEFT EAR, UNSPECIFIED HEARING LOSS TYPE: ICD-10-CM

## 2024-06-10 DIAGNOSIS — Z00.00 MEDICARE ANNUAL WELLNESS VISIT, SUBSEQUENT: Primary | ICD-10-CM

## 2024-06-10 PROCEDURE — 3078F DIAST BP <80 MM HG: CPT | Performed by: FAMILY MEDICINE

## 2024-06-10 PROCEDURE — G0439 PPPS, SUBSEQ VISIT: HCPCS | Performed by: FAMILY MEDICINE

## 2024-06-10 PROCEDURE — 1036F TOBACCO NON-USER: CPT | Performed by: FAMILY MEDICINE

## 2024-06-10 PROCEDURE — 1159F MED LIST DOCD IN RCRD: CPT | Performed by: FAMILY MEDICINE

## 2024-06-10 PROCEDURE — 3079F DIAST BP 80-89 MM HG: CPT | Performed by: FAMILY MEDICINE

## 2024-06-10 PROCEDURE — UHSPHYS PR UH SELECT PHYSICAL: Performed by: FAMILY MEDICINE

## 2024-06-10 PROCEDURE — 1126F AMNT PAIN NOTED NONE PRSNT: CPT | Performed by: FAMILY MEDICINE

## 2024-06-10 PROCEDURE — 3074F SYST BP LT 130 MM HG: CPT | Performed by: FAMILY MEDICINE

## 2024-06-10 PROCEDURE — 3077F SYST BP >= 140 MM HG: CPT | Performed by: FAMILY MEDICINE

## 2024-06-10 ASSESSMENT — LIFESTYLE VARIABLES
AUDIT-C TOTAL SCORE: 1
SKIP TO QUESTIONS 9-10: 1
HOW OFTEN DO YOU HAVE A DRINK CONTAINING ALCOHOL: MONTHLY OR LESS
HOW MANY STANDARD DRINKS CONTAINING ALCOHOL DO YOU HAVE ON A TYPICAL DAY: PATIENT DOES NOT DRINK
HOW OFTEN DO YOU HAVE SIX OR MORE DRINKS ON ONE OCCASION: NEVER

## 2024-06-10 ASSESSMENT — PAIN SCALES - GENERAL
PAINLEVEL: 0-NO PAIN
PAINLEVEL: 0-NO PAIN

## 2024-06-10 ASSESSMENT — PATIENT HEALTH QUESTIONNAIRE - PHQ9
SUM OF ALL RESPONSES TO PHQ9 QUESTIONS 1 & 2: 0
2. FEELING DOWN, DEPRESSED OR HOPELESS: NOT AT ALL
1. LITTLE INTEREST OR PLEASURE IN DOING THINGS: NOT AT ALL

## 2024-06-10 ASSESSMENT — ENCOUNTER SYMPTOMS
LOSS OF SENSATION IN FEET: 0
OCCASIONAL FEELINGS OF UNSTEADINESS: 0
DEPRESSION: 0

## 2024-06-10 NOTE — PROGRESS NOTES
"Subjective     Patient ID: Karyn Bliss \"Pranay" is a 89 y.o. female who presents for Annual Exam ([facility]).  HPI  1) did have some hearing loss left side and seem to be associated with an upper respiratory phenomenon  No pain with this  Although its gotten better, it still not resolved, would appreciate getting this checked out further    2) history of foot problems, did [facility] and was given some insoles and going back for more customized fitting, does feel things have made a difference    3) ongoing use of electrolyte supplement  Taking 1 pack a day has really improved the cramping in her hands and feet and also stopped her diarrhea problem    4) still gets occasional pain on her right side   This is not really limiting   Question if this is really muscular as had scan per GI which was not really revealing  Occasionally can she can get the pain with a bowel movement, but this is overall not worsening    5) ongoing issues with right arm, question shoulder she can really not sleep on that side often can be more in her arm than her shoulder per se  No arm weakness with associated weakness    6) Here to follow-up hypertension -denies chest pain, shortness of breath, dizziness, lightheadedness, hand or foot swelling that is new or different.  Taking and tolerating medications well.  Doing well with limited physical activity - stairs    7) has had 2 root canals and 2 implants in the recent past  However her dentist not overly concerned about what is going on, she question if there may be a bone issue at play here    8) spot on her left leg  Does see Dr. Berg in dermatology  She can feel it but it did not cause any itching or pain  She has a prior history of skin cancer    9) preventive care  Vaccines due: Prevnar 20, discussed getting it pharmacy  Mammography: considering skipping this year and doing in 2025   Bone density: would like to do and will need to schedule through [facility]    Review of " "Systems  Essentially noncontributory otherwise    Objective   /66   Pulse 70   Temp 36.8 °C (98.3 °F) (Temporal)   Ht 1.696 m (5' 6.77\")   Wt 94.3 kg (208 lb)   SpO2 96%   BMI 32.80 kg/m²     Physical Exam  Vitals and nursing note reviewed.   Constitutional:       General: She is not in acute distress.     Appearance: She is not ill-appearing.   HENT:      Head: Normocephalic and atraumatic.      Right Ear: Tympanic membrane normal.      Left Ear: Tympanic membrane normal.      Mouth/Throat:      Pharynx: No posterior oropharyngeal erythema.   Eyes:      General: No scleral icterus.     Extraocular Movements: Extraocular movements intact.      Pupils: Pupils are equal, round, and reactive to light.   Cardiovascular:      Rate and Rhythm: Normal rate and regular rhythm.      Heart sounds: No murmur heard.  Pulmonary:      Breath sounds: No wheezing.   Abdominal:      General: There is no distension.      Palpations: Abdomen is soft.      Tenderness: There is no abdominal tenderness.   Musculoskeletal:         General: Normal range of motion.      Cervical back: Normal range of motion.      Right lower leg: No edema.      Left lower leg: No edema.   Lymphadenopathy:      Cervical: No cervical adenopathy.   Skin:     General: Skin is warm and dry.      Comments: Mid anterior left shin is approximately 5 mm round slightly raised lesion that blanches easily upon palpation without tenderness   Neurological:      Mental Status: She is alert and oriented to person, place, and time. Mental status is at baseline.      Motor: No weakness.      Coordination: Coordination normal.      Deep Tendon Reflexes: Reflexes normal.   Psychiatric:         Mood and Affect: Mood normal.         Behavior: Behavior normal.         Thought Content: Thought content normal.         Judgment: Judgment normal.         Assessment/Plan   Problem List Items Addressed This Visit       Essential hypertension, benign     Other Visit " Diagnoses       Encounter for general adult medical examination with abnormal findings    -  Primary    Osteopenia, unspecified location        Relevant Orders    XR DEXA bone density    Acquired absence of ovaries, bilateral        Relevant Orders    XR DEXA bone density    Hearing loss of left ear, unspecified hearing loss type        Relevant Orders    Referral to Audiology    Benign skin lesion        Diarrhea, unspecified type            1) L hearing loss - refer to audiology    2) history of foot problems - follow-up [facility]    3) cont use of electrolyte supplement    4) abd pain - stable, not limiting, follow    5) R shoulder/arm pain - not limiting - follow    6)  HTN - stable, cpm    7) DDS issues - managing - ck BMD    8) spot on her left leg - benign     9) preventive care - follow-ups as noted

## 2024-06-10 NOTE — PROGRESS NOTES
"Karyn Bliss is a 89 year old here for a Medicare Annual Wellness Visit     Health Risk Assessment  In general, health is:  good     Concerns with balance:  goes carefully when walks and when in bathroom- she does not need a walker or cane - careful going up 3 steps w/o a railing      Concerns with teeth or dentures:  yes - getting regular care      Concerns with sexual function:  not noted     Felt anxious, stressed, angry, irritable, lonely, isolated, or had thoughts of hurting themself:  not really, has two very attentive dtrs - often takes initiative to reach out      Has little interest or pleasure in doing things:  no     Bothered by feeling down, depressed, or hopeless:  no      Needs help with grocery shopping, cooking, housework, bathing, grooming, dressing, eating, sitting or standing, walking, using the toilet, handling finances, taking medications, using the telephone, or driving:  no     Following safety precautions in the home environment and vehicle: removed throw rugs from floors, installed grab bars in the bathroom, handrails in stairwells, having adequate lighting, wearing seatbelt at all times?:  yes     Smokes cigarettes, vapes, or chew tobacco:  no     Eats healthy foods including fruits, vegetables, whole grains, and fiber-rich foods:  \"I love carbs\" - has not really expanded her diet      Number of days per week engages in exercise:  really just does stairs     Average alcohol consumption: very rare         Current Providers  Specialists: I have reviewed specialist-related care of the patient in the medical record.  Derm: Morena Copeland: Roman  DPM: Cheryl (in Candis's office)  Audiology: has been to [facility]  GI: Bautista       Medical/Family history review  Reviewed and updated problem list, medical/surgical/family/social history, medications, and allergies.     Opioid use review  Patient use of opioids:  0           Depression screening  Depression Screening PHQ-2 Score   Today 0       "   Depression screening tool completed and reviewed. Based on score and interview, patient is not at risk for depression. Screening tool discussed with patient, and I recommended no further intervention at this time.     Cognitive screening  Mini Cog Score:  5/5     Cognitive screening reviewed and plan:  not indicated     Functional Observation  Was the patient's timed Up & Go test unsteady or >= 12 seconds?  no     Advance Care Planning  End of Life planning discussed, including patient's advanced directive wishes:  has    Vision and Hearing assessment  Visual acuity (required for Welcome to Medicare):  up to date  Hearing Evaluation:  will work to obtain    ====================================================    HPI  1) did have some hearing loss left side and seem to be associated with an upper respiratory phenomenon  No pain with this  Although its gotten better, it still not resolved, would appreciate getting this checked out further    2) history of foot problems, [facility] and was given some insoles and going back for more customized fitting, does feel things have made a difference    3) ongoing use of electrolyte supplement  Taking 1 pack a day has really improved the cramping in her hands and feet and also stopped her diarrhea problem    4) still gets occasional pain on her right side   This is not really limiting   Question if this is really muscular as had scan per GI which was not really revealing  Occasionally can she can get the pain with a bowel movement, but this is overall not worsening    5) ongoing issues with right arm, question shoulder she can really not sleep on that side often can be more in her arm than her shoulder per se  No arm weakness with associated weakness    6) Here to follow-up hypertension -denies chest pain, shortness of breath, dizziness, lightheadedness, hand or foot swelling that is new or different.  Taking and tolerating medications well.  Doing well with  "limited physical activity - stairs    7) has had 2 root canals and 2 implants in the recent past  However her dentist not overly concerned about what is going on, she question if there may be a bone issue at play here    8) spot on her left leg  Does see Dr. Berg in dermatology  She can feel it but it did not cause any itching or pain  She has a prior history of skin cancer    9) preventive care  Vaccines due: Prevnar 20, discussed getting it pharmacy  Mammography: considering skipping this year and doing in 2025   Bone density: would like to do and will need to schedule through [facility]    Review of Systems  Essentially noncontributory otherwise    Objective   /66   Pulse 70   Temp 36.8 °C (98.3 °F) (Temporal)   Ht 1.696 m (5' 6.77\")   Wt 94.3 kg (208 lb)   SpO2 96%   BMI 32.80 kg/m²     Physical Exam  Vitals and nursing note reviewed.   Constitutional:       General: She is not in acute distress.     Appearance: She is not ill-appearing.   HENT:      Head: Normocephalic and atraumatic.      Right Ear: Tympanic membrane normal.      Left Ear: Tympanic membrane normal.      Mouth/Throat:      Pharynx: No posterior oropharyngeal erythema.   Eyes:      General: No scleral icterus.     Extraocular Movements: Extraocular movements intact.      Pupils: Pupils are equal, round, and reactive to light.   Cardiovascular:      Rate and Rhythm: Normal rate and regular rhythm.      Heart sounds: No murmur heard.  Pulmonary:      Breath sounds: No wheezing.   Abdominal:      General: There is no distension.      Palpations: Abdomen is soft.      Tenderness: There is no abdominal tenderness.   Musculoskeletal:         General: Normal range of motion.      Cervical back: Normal range of motion.      Right lower leg: No edema.      Left lower leg: No edema.   Lymphadenopathy:      Cervical: No cervical adenopathy.   Skin:     General: Skin is warm and dry.      Comments: Mid anterior left shin is approximately 5 mm round " slightly raised lesion that blanches easily upon palpation without tenderness   Neurological:      Mental Status: She is alert and oriented to person, place, and time. Mental status is at baseline.      Motor: No weakness.      Coordination: Coordination normal.      Deep Tendon Reflexes: Reflexes normal.   Psychiatric:         Mood and Affect: Mood normal.         Behavior: Behavior normal.         Thought Content: Thought content normal.         Judgment: Judgment normal.         Assessment/Plan   Problem List Items Addressed This Visit       Essential hypertension, benign     Other Visit Diagnoses       Encounter for general adult medical examination with abnormal findings    -  Primary    Osteopenia, unspecified location        Relevant Orders    XR DEXA bone density    Acquired absence of ovaries, bilateral        Relevant Orders    XR DEXA bone density    Hearing loss of left ear, unspecified hearing loss type        Relevant Orders    Referral to Audiology    Benign skin lesion        Diarrhea, unspecified type            1) L hearing loss - refer to audiology    2) history of foot problems - follow-up [facility]    3) cont use of electrolyte supplement    4) abd pain - stable, not limiting, follow    5) R shoulder/arm pain - not limiting - follow    6)  HTN - stable, cpm    7) DDS issues - managing - ck BMD    8) spot on her left leg - benign     9) preventive care - follow-ups as noted

## 2024-06-10 NOTE — Clinical Note
1) Please FAX BMD order to [facility] (I think their BMD office may be separate from radiology - gen # = [phone]) 2) Please sched w/ audio here (try to avoid Tues PM if possible) - THANKS!

## 2024-06-11 VITALS
OXYGEN SATURATION: 96 % | WEIGHT: 208 LBS | SYSTOLIC BLOOD PRESSURE: 124 MMHG | DIASTOLIC BLOOD PRESSURE: 66 MMHG | HEART RATE: 70 BPM | BODY MASS INDEX: 32.65 KG/M2 | TEMPERATURE: 98.3 F | HEIGHT: 67 IN

## 2024-06-18 ENCOUNTER — TELEPHONE (OUTPATIENT)
Dept: PRIMARY CARE | Facility: CLINIC | Age: 89
End: 2024-06-18
Payer: MEDICARE

## 2024-06-18 ENCOUNTER — CLINICAL SUPPORT (OUTPATIENT)
Dept: AUDIOLOGY | Facility: CLINIC | Age: 89
End: 2024-06-18
Payer: MEDICARE

## 2024-06-18 DIAGNOSIS — H90.A21 SENSORINEURAL HEARING LOSS (SNHL) OF RIGHT EAR WITH RESTRICTED HEARING OF LEFT EAR: ICD-10-CM

## 2024-06-18 DIAGNOSIS — H91.20 SUDDEN HEARING LOSS, UNSPECIFIED LATERALITY: ICD-10-CM

## 2024-06-18 DIAGNOSIS — H90.A32 MIXED CONDUCTIVE AND SENSORINEURAL HEARING LOSS OF LEFT EAR WITH RESTRICTED HEARING OF RIGHT EAR: Primary | ICD-10-CM

## 2024-06-18 PROCEDURE — 92567 TYMPANOMETRY: CPT | Performed by: AUDIOLOGIST

## 2024-06-18 PROCEDURE — 92557 COMPREHENSIVE HEARING TEST: CPT | Performed by: AUDIOLOGIST

## 2024-06-18 NOTE — PROGRESS NOTES
Name: Karyn Bliss  YOB: 1934  Age: 89 y.o.    Date of Evaluation:  6/18/2024      History:      Patient presents today for hearing test in conjunction with ENT visit scheduled for next week.  Patient reports bilateral hearing loss, with sudden decline this morning. She has been treated for upper respiratory infection recently, however hearing has continued to decline.  Denies tinnitus, vertigo.    Evaluation:    Otoscopy revealed clear ear canals bilaterally.    Immittance testing revealed normal middle ear function right ear, significant negative middle ear pressure/hypomobile eardrum left ear.  Right ear:  moderate to severe sensorineural hearing loss with good word discrimination (92%)  Left ear: severe to profound mixed hearing loss with poor word discrimination (68%)      Treatment Plan:    - Follow up with ENT as scheduled  - Reinstate use of amplification pending medical clearance; may need reprogrammed to updated thresholds following treatment  - Retest hearing following otologic management      [id number]    Osmar Nicole, CCC-A

## 2024-06-18 NOTE — TELEPHONE ENCOUNTER
"Patient states that her hearing is significantly worse within the last 24 hours.  Her daughter walked into her room and she was unable to hear her at all - she didn't know she was there.   She also notes that even the sound of running water sounds different to her.  She questions if she should be seen by ENT or if we have any recommendation.    Per discussion with Haley Goldstein's office - patient is scheduled for a hearing test on Tuesday 6/25 with Dr. Engel.  She added an appointment to Dr. Goldstein's schedule as well.   Patient is aware of this.    Per discussion with Dr. Goldstein he stated \"Need to see the hearing test to decide what type of hearing loss she has. If it is sensorineural then we need steroids and MRI. If it is conductive then it could be fluid in the ear and MRI would be not needed at all.\"     Patient scheduled for STAT hearing test this afternoon at [facility].  She will also try the use of Flonase Sensimist as previously discussed.  "

## 2024-06-20 ENCOUNTER — TELEPHONE (OUTPATIENT)
Dept: PRIMARY CARE | Facility: CLINIC | Age: 89
End: 2024-06-20
Payer: MEDICARE

## 2024-06-20 DIAGNOSIS — H90.5 SENSORINEURAL HEARING LOSS (SNHL) OF RIGHT EAR, UNSPECIFIED HEARING STATUS ON CONTRALATERAL SIDE: ICD-10-CM

## 2024-06-20 DIAGNOSIS — H90.72 MIXED CONDUCTIVE AND SENSORINEURAL HEARING LOSS OF LEFT EAR, UNSPECIFIED HEARING STATUS ON CONTRALATERAL SIDE: ICD-10-CM

## 2024-06-20 DIAGNOSIS — H91.23 SUDDEN HEARING LOSS, BILATERAL: ICD-10-CM

## 2024-06-24 ENCOUNTER — TELEPHONE (OUTPATIENT)
Dept: GASTROENTEROLOGY | Facility: CLINIC | Age: 89
End: 2024-06-24
Payer: MEDICARE

## 2024-06-24 NOTE — TELEPHONE ENCOUNTER
Says since going on electrolyte supplements she has essentially stopped having diarrhea.    Will follow up as needed.

## 2024-06-27 ENCOUNTER — APPOINTMENT (OUTPATIENT)
Dept: AUDIOLOGY | Facility: CLINIC | Age: 89
End: 2024-06-27
Payer: MEDICARE

## 2024-06-27 ENCOUNTER — OFFICE VISIT (OUTPATIENT)
Dept: OTOLARYNGOLOGY | Facility: CLINIC | Age: 89
End: 2024-06-27
Payer: MEDICARE

## 2024-06-27 VITALS — BODY MASS INDEX: 32.65 KG/M2 | HEIGHT: 67 IN | WEIGHT: 208 LBS | TEMPERATURE: 98.3 F

## 2024-06-27 DIAGNOSIS — J30.89 NON-SEASONAL ALLERGIC RHINITIS DUE TO OTHER ALLERGIC TRIGGER: ICD-10-CM

## 2024-06-27 DIAGNOSIS — H61.21 IMPACTED CERUMEN OF RIGHT EAR: ICD-10-CM

## 2024-06-27 DIAGNOSIS — H65.02 NON-RECURRENT ACUTE SEROUS OTITIS MEDIA OF LEFT EAR: ICD-10-CM

## 2024-06-27 DIAGNOSIS — H90.3 SENSORINEURAL HEARING LOSS (SNHL) OF BOTH EARS: ICD-10-CM

## 2024-06-27 DIAGNOSIS — H69.92 DYSFUNCTION OF LEFT EUSTACHIAN TUBE: Primary | ICD-10-CM

## 2024-06-27 PROCEDURE — 1159F MED LIST DOCD IN RCRD: CPT | Performed by: OTOLARYNGOLOGY

## 2024-06-27 PROCEDURE — 99203 OFFICE O/P NEW LOW 30 MIN: CPT | Performed by: OTOLARYNGOLOGY

## 2024-06-27 PROCEDURE — 69210 REMOVE IMPACTED EAR WAX UNI: CPT | Performed by: OTOLARYNGOLOGY

## 2024-06-27 PROCEDURE — 1160F RVW MEDS BY RX/DR IN RCRD: CPT | Performed by: OTOLARYNGOLOGY

## 2024-06-27 PROCEDURE — 1036F TOBACCO NON-USER: CPT | Performed by: OTOLARYNGOLOGY

## 2024-06-27 RX ORDER — FLUTICASONE PROPIONATE 50 MCG
2 SPRAY, SUSPENSION (ML) NASAL DAILY
Qty: 48 ML | Refills: 0 | Status: SHIPPED | OUTPATIENT
Start: 2024-06-27

## 2024-06-27 NOTE — PROGRESS NOTES
"Subjective   Patient ID: Karyn Bliss \"Pranay" is a 89 y.o. female  HPI  Patient is complaining of a 1 month history of left ear fullness and congestion after she had an upper respiratory infection.  She feels that the hearing deteriorated further approximately 10 days ago.  She has no fevers or chills or nausea or vomiting.  She has no vertigo.  She does complain of a chronic history of bilateral nonpulsatile tinnitus and hearing loss and she has been using hearing aids.    Review of Systems   HENT:  Positive for hearing loss and tinnitus.    Cardiovascular:  Positive for leg swelling.       Objective   Physical Exam  The following elements of a brief ear nose and throat exam were performed: External ear canals and tympanic membranes, external nose and nasal passages, oral cavity, palpation of the neck, percussion of the face, palpation of the thyroid.    There is cerumen partially obstructing the ear canal on the right side and this was cleared using speculum and curettes.  The left tympanic membrane is retracted and there is fluid in the middle ear.  The tympanic membrane is moving with a Valsalva maneuver.  The remainder of her exam was within normal limits.  The hearing test dated June 18, 2024 was reviewed and there is bilateral sensorineural hearing loss with air-bone gap on the left side and type C tympanogram on the left side.    Ear cerumen removal    Date/Time: 6/27/2024 2:10 PM    Performed by: Nakia Goldstein MD  Authorized by: Nakia Goldstein MD    Consent:     Consent obtained:  Verbal    Risks discussed:  Pain  Procedure details:     Location:  R ear    Procedure type: curette        Assessment/Plan   Diagnoses and all orders for this visit:  Dysfunction of left eustachian tube (Primary)  Non-recurrent acute serous otitis media of left ear  Sensorineural hearing loss (SNHL) of both ears  Impacted cerumen of right ear  -     Ear cerumen removal  Non-seasonal allergic rhinitis due to other allergic " trigger  -     fluticasone (Flonase) 50 mcg/actuation nasal spray; Administer 2 sprays into each nostril once daily. Shake gently. Before first use, prime pump. After use, clean tip and replace cap.     1.  Left eustachian tube dysfunction with recent otitis media with effusion following an upper respiratory infection leading to conductive hearing loss on the left side.  The patient was advised to perform the Valsalva maneuver on a daily basis.  2.  Chronic allergic rhinitis contributing to the eustachian tube dysfunction.  She was advised to use Flonase on a daily basis.  3.  Chronic bilateral sensorineural hearing loss.  She was advised to continue using the hearing aids.  4.  Right cerumen impaction cleared today.  She will follow-up in 1 month.

## 2024-07-16 ENCOUNTER — APPOINTMENT (OUTPATIENT)
Dept: PRIMARY CARE | Facility: CLINIC | Age: 89
End: 2024-07-16
Payer: MEDICARE

## 2024-08-19 ENCOUNTER — HOSPITAL ENCOUNTER (OUTPATIENT)
Dept: RADIOLOGY | Facility: CLINIC | Age: 89
Discharge: HOME | End: 2024-08-19
Payer: MEDICARE

## 2024-08-19 DIAGNOSIS — M85.80 OSTEOPENIA, UNSPECIFIED LOCATION: ICD-10-CM

## 2024-08-19 DIAGNOSIS — Z90.722 ACQUIRED ABSENCE OF OVARIES, BILATERAL: ICD-10-CM

## 2024-08-19 PROCEDURE — 77080 DXA BONE DENSITY AXIAL: CPT | Performed by: RADIOLOGY

## 2024-08-19 PROCEDURE — 77080 DXA BONE DENSITY AXIAL: CPT

## 2024-08-19 ASSESSMENT — LIFESTYLE VARIABLES
3_OR_MORE_DRINKS_PER_DAY: N
CURRENT_SMOKER: N

## 2024-08-22 ENCOUNTER — APPOINTMENT (OUTPATIENT)
Dept: OTOLARYNGOLOGY | Facility: CLINIC | Age: 89
End: 2024-08-22
Payer: MEDICARE

## 2024-08-22 VITALS — BODY MASS INDEX: 32.65 KG/M2 | WEIGHT: 208 LBS | HEIGHT: 67 IN | TEMPERATURE: 98.3 F

## 2024-08-22 DIAGNOSIS — H69.92 DYSFUNCTION OF LEFT EUSTACHIAN TUBE: ICD-10-CM

## 2024-08-22 DIAGNOSIS — J30.89 NON-SEASONAL ALLERGIC RHINITIS DUE TO OTHER ALLERGIC TRIGGER: Primary | ICD-10-CM

## 2024-08-22 DIAGNOSIS — H90.3 SENSORINEURAL HEARING LOSS (SNHL) OF BOTH EARS: ICD-10-CM

## 2024-08-22 DIAGNOSIS — H90.A32 MIXED CONDUCTIVE AND SENSORINEURAL HEARING LOSS OF LEFT EAR WITH RESTRICTED HEARING OF RIGHT EAR: ICD-10-CM

## 2024-08-22 DIAGNOSIS — H65.02 NON-RECURRENT ACUTE SEROUS OTITIS MEDIA OF LEFT EAR: ICD-10-CM

## 2024-08-22 PROCEDURE — 92511 NASOPHARYNGOSCOPY: CPT | Performed by: OTOLARYNGOLOGY

## 2024-08-22 PROCEDURE — 99213 OFFICE O/P EST LOW 20 MIN: CPT | Performed by: OTOLARYNGOLOGY

## 2024-08-22 PROCEDURE — 1036F TOBACCO NON-USER: CPT | Performed by: OTOLARYNGOLOGY

## 2024-08-22 PROCEDURE — 1159F MED LIST DOCD IN RCRD: CPT | Performed by: OTOLARYNGOLOGY

## 2024-08-22 PROCEDURE — 1160F RVW MEDS BY RX/DR IN RCRD: CPT | Performed by: OTOLARYNGOLOGY

## 2024-08-22 RX ORDER — FLUTICASONE PROPIONATE 50 MCG
2 SPRAY, SUSPENSION (ML) NASAL DAILY
Qty: 48 ML | Refills: 1 | Status: SHIPPED | OUTPATIENT
Start: 2024-08-22

## 2024-08-22 NOTE — PROGRESS NOTES
"Subjective   Patient ID: Karyn Bliss \"Pranay" is a 89 y.o. female  HPI  Patient presents for follow-up for left otitis media with effusion and eustachian tube dysfunction and allergic rhinitis.  She is feeling better after starting the Flonase.  She continues to complain of some congestion in the left ear.    Review of Systems   HENT:  Positive for hearing loss and tinnitus.    Cardiovascular:  Positive for leg swelling.       Objective   Physical Exam  The following elements of a brief ear nose and throat exam were performed: External ear canals and tympanic membranes, external nose and nasal passages, oral cavity, palpation of the neck, percussion of the face, palpation of the thyroid.    The left tympanic membrane is retracted and it is moving with a Valsalva maneuver.  There is mild nasal edema and the turbinates are pale.  There is no purulence or facial tenderness noted.  The fluid in the middle ear on the left side appears to have resolved.  Fiberoptic nasopharyngoscopy was offered in light of the eustachian tube dysfunction.  The nasal cavity and nasopharynx and hypopharynx were noted to be clear.  The eustachian tube folds are noted to be normal.  Improved with the Valsalva maneuver and management of the allergies.    Nasopharyngoscopy    Date/Time: 8/22/2024 2:27 PM    Performed by: Nakia Goldstein MD  Authorized by: Nakia Goldstein MD        Assessment/Plan   Diagnoses and all orders for this visit:  Non-seasonal allergic rhinitis due to other allergic trigger (Primary)  -     fluticasone (Flonase) 50 mcg/actuation nasal spray; Administer 2 sprays into each nostril once daily. Shake gently. Before first use, prime pump. After use, clean tip and replace cap.  Dysfunction of left eustachian tube  -     Nasopharyngoscopy  Non-recurrent acute serous otitis media of left ear  Sensorineural hearing loss (SNHL) of both ears  -     Comprehensive hearing test; Future  Mixed conductive and sensorineural hearing loss " of left ear with restricted hearing of right ear  -     Comprehensive hearing test; Future       1.  Left eustachian tube dysfunction with recent otitis media with effusion following an upper respiratory infection leading to conductive hearing loss on the left side.  The patient was advised to perform the Valsalva maneuver on a daily basis.  2.  Chronic allergic rhinitis contributing to the eustachian tube dysfunction improved with Flonase.  3.  Chronic bilateral sensorineural hearing loss.  She was advised to continue using the hearing aids.  Her prescription was renewed and she will follow-up in 6 months with a repeat hearing test.

## 2024-09-09 ENCOUNTER — APPOINTMENT (OUTPATIENT)
Dept: PRIMARY CARE | Facility: CLINIC | Age: 89
End: 2024-09-09
Payer: MEDICARE

## 2024-09-09 VITALS
DIASTOLIC BLOOD PRESSURE: 76 MMHG | TEMPERATURE: 97.8 F | OXYGEN SATURATION: 97 % | SYSTOLIC BLOOD PRESSURE: 124 MMHG | BODY MASS INDEX: 33.18 KG/M2 | WEIGHT: 210.4 LBS | HEART RATE: 77 BPM

## 2024-09-09 DIAGNOSIS — R53.83 OTHER FATIGUE: ICD-10-CM

## 2024-09-09 DIAGNOSIS — M85.80 OSTEOPENIA, UNSPECIFIED LOCATION: ICD-10-CM

## 2024-09-09 DIAGNOSIS — H69.92 EUSTACHIAN TUBE DISORDER, LEFT: ICD-10-CM

## 2024-09-09 DIAGNOSIS — I10 ESSENTIAL HYPERTENSION, BENIGN: Primary | ICD-10-CM

## 2024-09-09 PROCEDURE — 3074F SYST BP LT 130 MM HG: CPT | Performed by: FAMILY MEDICINE

## 2024-09-09 PROCEDURE — 99214 OFFICE O/P EST MOD 30 MIN: CPT | Performed by: FAMILY MEDICINE

## 2024-09-09 PROCEDURE — 1126F AMNT PAIN NOTED NONE PRSNT: CPT | Performed by: FAMILY MEDICINE

## 2024-09-09 PROCEDURE — 1159F MED LIST DOCD IN RCRD: CPT | Performed by: FAMILY MEDICINE

## 2024-09-09 PROCEDURE — 3078F DIAST BP <80 MM HG: CPT | Performed by: FAMILY MEDICINE

## 2024-09-09 PROCEDURE — 1036F TOBACCO NON-USER: CPT | Performed by: FAMILY MEDICINE

## 2024-09-09 ASSESSMENT — PAIN SCALES - GENERAL: PAINLEVEL: 0-NO PAIN

## 2024-09-09 NOTE — PROGRESS NOTES
"Subjective   Patient ID: Karyn Bliss \"Pranay" is a 89 y.o. female who presents for Follow-up (3 MO FUV).  HPI  1) BMD testing - reviewed recent report,  showed mild osteopenia in two areas and normal in spine - no prior fxs     2) ETD issue - still not back to baseline - but has hearing screen to be done before next ENT visit - continues to use nasal steroid    3) vaccinations - UTD for most part - will get COVID now and flu in fall    4) Hx hypertension - denies chest pain, shortness of breath, dizziness, lightheadedness, hand or foot swelling that is new or different.  Taking and tolerating medications well - will take the diuretic if some leg swelling.  Doing well with physical activity    5) Hx GERD -taking and tolerating omeprazole well -however, given her history of fatigue, will check B12 level at next blood draw      Review of Systems  Essentially noncontributory otherwise    Objective   /76 (BP Location: Left arm, Patient Position: Sitting, BP Cuff Size: Large adult)   Pulse 77   Temp 36.6 °C (97.8 °F) (Temporal)   Wt 95.4 kg (210 lb 6.4 oz)   SpO2 97%   BMI 33.18 kg/m²     Physical Exam  General: No acute distress, appears well/at baseline  HEENT: No conjunctival injection or scleral icterus  Neck: No gross JVD or carotid bruits auscultated  Cardiac: Regular rate and rhythm, normal S1-S2 with systolic ejection murmur noted previously (II/VI)   Lungs: Clear to auscultation bilaterally no wheeze or crackles  Abdomen: Soft, nontender nondistended no HSM  Extremities: Appear well-perfused with slight edema Lower Extremities bilaterally left slightly greater than right    Assessment/Plan   Problem List Items Addressed This Visit       Essential hypertension, benign - Primary    Relevant Orders    CBC and Auto Differential    Basic metabolic panel     Other Visit Diagnoses       Osteopenia, unspecified location        Eustachian tube disorder, left        Other fatigue        Relevant Orders    " Vitamin B12        Issues appear relatively stable overall  Continue present management  Follow-up in 3 months  Vaccines as noted in the interim  Labs prior to visit

## 2024-12-09 ENCOUNTER — LAB (OUTPATIENT)
Dept: LAB | Facility: LAB | Age: 89
End: 2024-12-09
Payer: MEDICARE

## 2024-12-09 ENCOUNTER — APPOINTMENT (OUTPATIENT)
Dept: PRIMARY CARE | Facility: CLINIC | Age: 89
End: 2024-12-09
Payer: MEDICARE

## 2024-12-09 DIAGNOSIS — I10 ESSENTIAL HYPERTENSION, BENIGN: ICD-10-CM

## 2024-12-09 DIAGNOSIS — R53.83 OTHER FATIGUE: ICD-10-CM

## 2024-12-09 LAB
ANION GAP SERPL CALC-SCNC: 14 MMOL/L (ref 10–20)
BASOPHILS # BLD AUTO: 0.04 X10*3/UL (ref 0–0.1)
BASOPHILS NFR BLD AUTO: 0.5 %
BUN SERPL-MCNC: 26 MG/DL (ref 6–23)
CALCIUM SERPL-MCNC: 8.9 MG/DL (ref 8.6–10.6)
CHLORIDE SERPL-SCNC: 106 MMOL/L (ref 98–107)
CO2 SERPL-SCNC: 26 MMOL/L (ref 21–32)
CREAT SERPL-MCNC: 0.84 MG/DL (ref 0.5–1.05)
EGFRCR SERPLBLD CKD-EPI 2021: 66 ML/MIN/1.73M*2
EOSINOPHIL # BLD AUTO: 0.27 X10*3/UL (ref 0–0.4)
EOSINOPHIL NFR BLD AUTO: 3.5 %
ERYTHROCYTE [DISTWIDTH] IN BLOOD BY AUTOMATED COUNT: 12.9 % (ref 11.5–14.5)
GLUCOSE SERPL-MCNC: 90 MG/DL (ref 74–99)
HCT VFR BLD AUTO: 39.9 % (ref 36–46)
HGB BLD-MCNC: 12.8 G/DL (ref 12–16)
IMM GRANULOCYTES # BLD AUTO: 0.05 X10*3/UL (ref 0–0.5)
IMM GRANULOCYTES NFR BLD AUTO: 0.7 % (ref 0–0.9)
LYMPHOCYTES # BLD AUTO: 1.8 X10*3/UL (ref 0.8–3)
LYMPHOCYTES NFR BLD AUTO: 23.5 %
MCH RBC QN AUTO: 30.3 PG (ref 26–34)
MCHC RBC AUTO-ENTMCNC: 32.1 G/DL (ref 32–36)
MCV RBC AUTO: 95 FL (ref 80–100)
MONOCYTES # BLD AUTO: 0.8 X10*3/UL (ref 0.05–0.8)
MONOCYTES NFR BLD AUTO: 10.4 %
NEUTROPHILS # BLD AUTO: 4.71 X10*3/UL (ref 1.6–5.5)
NEUTROPHILS NFR BLD AUTO: 61.4 %
NRBC BLD-RTO: 0 /100 WBCS (ref 0–0)
PLATELET # BLD AUTO: 286 X10*3/UL (ref 150–450)
POTASSIUM SERPL-SCNC: 3.9 MMOL/L (ref 3.5–5.3)
RBC # BLD AUTO: 4.22 X10*6/UL (ref 4–5.2)
SODIUM SERPL-SCNC: 142 MMOL/L (ref 136–145)
VIT B12 SERPL-MCNC: 271 PG/ML (ref 211–911)
WBC # BLD AUTO: 7.7 X10*3/UL (ref 4.4–11.3)

## 2024-12-09 PROCEDURE — 36415 COLL VENOUS BLD VENIPUNCTURE: CPT

## 2024-12-09 PROCEDURE — 80048 BASIC METABOLIC PNL TOTAL CA: CPT

## 2024-12-09 PROCEDURE — 82607 VITAMIN B-12: CPT

## 2024-12-09 PROCEDURE — 85025 COMPLETE CBC W/AUTO DIFF WBC: CPT

## 2024-12-11 ENCOUNTER — APPOINTMENT (OUTPATIENT)
Dept: PRIMARY CARE | Facility: CLINIC | Age: 89
End: 2024-12-11
Payer: MEDICARE

## 2024-12-11 VITALS
DIASTOLIC BLOOD PRESSURE: 68 MMHG | WEIGHT: 207.4 LBS | TEMPERATURE: 97 F | BODY MASS INDEX: 32.71 KG/M2 | HEART RATE: 72 BPM | SYSTOLIC BLOOD PRESSURE: 129 MMHG | OXYGEN SATURATION: 99 %

## 2024-12-11 DIAGNOSIS — I10 ESSENTIAL HYPERTENSION, BENIGN: ICD-10-CM

## 2024-12-11 DIAGNOSIS — R53.1 GENERALIZED WEAKNESS: ICD-10-CM

## 2024-12-11 DIAGNOSIS — R19.7 DIARRHEA, UNSPECIFIED TYPE: Primary | ICD-10-CM

## 2024-12-11 PROCEDURE — 3078F DIAST BP <80 MM HG: CPT | Performed by: FAMILY MEDICINE

## 2024-12-11 PROCEDURE — 1126F AMNT PAIN NOTED NONE PRSNT: CPT | Performed by: FAMILY MEDICINE

## 2024-12-11 PROCEDURE — 99215 OFFICE O/P EST HI 40 MIN: CPT | Performed by: FAMILY MEDICINE

## 2024-12-11 PROCEDURE — 3074F SYST BP LT 130 MM HG: CPT | Performed by: FAMILY MEDICINE

## 2024-12-11 RX ORDER — DIPHENOXYLATE HYDROCHLORIDE AND ATROPINE SULFATE 2.5; .025 MG/1; MG/1
1 TABLET ORAL 4 TIMES DAILY PRN
Qty: 60 TABLET | Refills: 0 | Status: SHIPPED | OUTPATIENT
Start: 2024-12-11 | End: 2025-01-10

## 2024-12-11 ASSESSMENT — PAIN SCALES - GENERAL: PAINLEVEL_OUTOF10: 0-NO PAIN

## 2024-12-11 NOTE — PROGRESS NOTES
"Subjective   Patient ID: Karyn Bliss \"Pranay" is a 89 y.o. female who presents for No chief complaint on file..  HPI  1) Here to follow-up hypertension - denies chest pain, shortness of breath, dizziness, lightheadedness, hand or foot swelling that is new or different.  Taking and tolerating medications well.  Doing well with physical activity.    2) in that vein regarding physical activity, she has some concerns about difficulty getting up out of a chair without using her hands  She can usually do it from her bed but that is 24 inches high  She would like to work with physical therapy to gain some strength and stability when she stands  No other gross weakness reported  No recent trauma or fall, she is really interested in preventing such however    3) ongoing diarrhea issues which she feels she manages relatively well with the Imodium -she takes something most days but not every day  She has grown frustrated with how the material is packaged and sent to her  Very difficult as she has to have scissors handy to open it which is not always inconvenient if she is traveling or in a certain situation  No change in stool character such as blood in stool or dark stool  No marked change in appetite  She would be interested in trying something different    Review of Systems  Essentially noncontributory otherwise    Objective     Physical Exam  General: No acute distress  HEENT no conjunctival injection or scleral icterus  Neck: No gross JVD or marked thyromegaly  Heart: Regular rate and rhythm, normal S1-S2 without S3-S4 or murmur  Lungs: Clear to auscultation no wheeze or crackles  Extremities: Appear well-perfused with slight lower extremity edema with compression stockings on and good peripheral upper extremity pulses  Abdomen: Soft, nontender nondistended with normal active bowel sounds  Assessment/Plan   Problem List Items Addressed This Visit       Essential hypertension, benign     Other Visit Diagnoses       " Diarrhea, unspecified type    -  Primary    Relevant Medications    diphenoxylate-atropine (Lomotil) 2.5-0.025 mg tablet    Generalized weakness        Relevant Orders    Referral to Physical Therapy        Doing very well overall  Plan as noted per orders - referring to [facility]  Follow-up in 3 months w/o labs    TVT of 40 minutes with greater than 50% spent FTF in assessment/discussion and care coordination

## 2025-01-30 DIAGNOSIS — I10 ESSENTIAL HYPERTENSION, BENIGN: ICD-10-CM

## 2025-01-30 DIAGNOSIS — K63.8219 SMALL INTESTINAL BACTERIAL OVERGROWTH (SIBO): ICD-10-CM

## 2025-01-30 DIAGNOSIS — R19.7 DIARRHEA, UNSPECIFIED TYPE: ICD-10-CM

## 2025-01-30 RX ORDER — ATENOLOL 25 MG/1
25 TABLET ORAL DAILY
Qty: 90 TABLET | Refills: 3 | Status: SHIPPED | OUTPATIENT
Start: 2025-01-30 | End: 2025-01-30 | Stop reason: SDUPTHER

## 2025-01-30 RX ORDER — FUROSEMIDE 20 MG/1
20 TABLET ORAL DAILY PRN
Qty: 90 TABLET | Refills: 3 | Status: CANCELLED | OUTPATIENT
Start: 2025-01-30

## 2025-01-30 RX ORDER — LOSARTAN POTASSIUM AND HYDROCHLOROTHIAZIDE 25; 100 MG/1; MG/1
1 TABLET ORAL DAILY
Qty: 90 TABLET | Refills: 3 | Status: SHIPPED | OUTPATIENT
Start: 2025-01-30 | End: 2025-01-30 | Stop reason: SDUPTHER

## 2025-01-30 RX ORDER — LOSARTAN POTASSIUM AND HYDROCHLOROTHIAZIDE 25; 100 MG/1; MG/1
1 TABLET ORAL DAILY
Qty: 90 TABLET | Refills: 3 | Status: SHIPPED | OUTPATIENT
Start: 2025-01-30

## 2025-01-30 RX ORDER — ATENOLOL 25 MG/1
25 TABLET ORAL DAILY
Qty: 90 TABLET | Refills: 3 | Status: CANCELLED | OUTPATIENT
Start: 2025-01-30

## 2025-01-30 RX ORDER — ATENOLOL 25 MG/1
25 TABLET ORAL DAILY
Qty: 90 TABLET | Refills: 3 | Status: SHIPPED | OUTPATIENT
Start: 2025-01-30

## 2025-01-30 RX ORDER — DIPHENOXYLATE HYDROCHLORIDE AND ATROPINE SULFATE 2.5; .025 MG/1; MG/1
1 TABLET ORAL 4 TIMES DAILY PRN
Qty: 90 TABLET | Refills: 1 | Status: SHIPPED | OUTPATIENT
Start: 2025-01-30

## 2025-01-30 RX ORDER — DIPHENOXYLATE HYDROCHLORIDE AND ATROPINE SULFATE 2.5; .025 MG/1; MG/1
1 TABLET ORAL 4 TIMES DAILY PRN
COMMUNITY
End: 2025-01-30 | Stop reason: SDUPTHER

## 2025-01-30 NOTE — TELEPHONE ENCOUNTER
Requested Prescriptions     Signed Prescriptions Disp Refills    diphenoxylate-atropine (Lomotil) 2.5-0.025 mg tablet 90 tablet 1     Sig: Take 1 tablet by mouth 4 times a day as needed for diarrhea.     Authorizing Provider: RICK ESCOBAR    losartan-hydrochlorothiazide (Hyzaar) 100-25 mg tablet 90 tablet 3     Sig: Take 1 tablet by mouth once daily.     Authorizing Provider: RICK ESCOBAR    atenolol (Tenormin) 25 mg tablet 90 tablet 3     Sig: Take 1 tablet (25 mg) by mouth once daily.     Authorizing Provider: RICK ESCOBAR

## 2025-02-25 ENCOUNTER — APPOINTMENT (OUTPATIENT)
Dept: OTOLARYNGOLOGY | Facility: CLINIC | Age: OVER 89
End: 2025-02-25
Payer: MEDICARE

## 2025-02-25 ENCOUNTER — APPOINTMENT (OUTPATIENT)
Dept: AUDIOLOGY | Facility: CLINIC | Age: OVER 89
End: 2025-02-25
Payer: MEDICARE

## 2025-02-25 VITALS — WEIGHT: 207 LBS | BODY MASS INDEX: 32.49 KG/M2 | HEIGHT: 67 IN

## 2025-02-25 DIAGNOSIS — H90.3 SENSORINEURAL HEARING LOSS (SNHL) OF BOTH EARS: ICD-10-CM

## 2025-02-25 DIAGNOSIS — H69.93 DYSFUNCTION OF BOTH EUSTACHIAN TUBES: Primary | ICD-10-CM

## 2025-02-25 DIAGNOSIS — H90.A32 MIXED CONDUCTIVE AND SENSORINEURAL HEARING LOSS OF LEFT EAR WITH RESTRICTED HEARING OF RIGHT EAR: ICD-10-CM

## 2025-02-25 DIAGNOSIS — J30.89 NON-SEASONAL ALLERGIC RHINITIS DUE TO OTHER ALLERGIC TRIGGER: ICD-10-CM

## 2025-02-25 DIAGNOSIS — H61.23 BILATERAL IMPACTED CERUMEN: ICD-10-CM

## 2025-02-25 DIAGNOSIS — H69.92 DYSFUNCTION OF LEFT EUSTACHIAN TUBE: Primary | ICD-10-CM

## 2025-02-25 DIAGNOSIS — H90.A22 SENSORINEURAL HEARING LOSS (SNHL) OF LEFT EAR WITH RESTRICTED HEARING OF RIGHT EAR: ICD-10-CM

## 2025-02-25 PROCEDURE — 92556 SPEECH AUDIOMETRY COMPLETE: CPT

## 2025-02-25 PROCEDURE — 1159F MED LIST DOCD IN RCRD: CPT | Performed by: OTOLARYNGOLOGY

## 2025-02-25 PROCEDURE — 92552 PURE TONE AUDIOMETRY AIR: CPT

## 2025-02-25 PROCEDURE — 1160F RVW MEDS BY RX/DR IN RCRD: CPT | Performed by: OTOLARYNGOLOGY

## 2025-02-25 PROCEDURE — 92567 TYMPANOMETRY: CPT

## 2025-02-25 PROCEDURE — 99213 OFFICE O/P EST LOW 20 MIN: CPT | Performed by: OTOLARYNGOLOGY

## 2025-02-25 PROCEDURE — 69210 REMOVE IMPACTED EAR WAX UNI: CPT | Performed by: OTOLARYNGOLOGY

## 2025-02-25 PROCEDURE — 1036F TOBACCO NON-USER: CPT | Performed by: OTOLARYNGOLOGY

## 2025-02-25 RX ORDER — FLUTICASONE PROPIONATE 50 MCG
2 SPRAY, SUSPENSION (ML) NASAL DAILY
Qty: 48 ML | Refills: 3 | Status: SHIPPED | OUTPATIENT
Start: 2025-02-25

## 2025-02-25 NOTE — PROGRESS NOTES
"Subjective   Patient ID: Karyn Bliss \"Pranay" is a 89 y.o. female  HPI  Patient presents for follow-up for left eustachian tube dysfunction and allergic rhinitis.  She continues to use the Flonase as needed.  She continues to complain of some congestion in the left ear.    Review of Systems   HENT:  Positive for hearing loss and tinnitus.    Cardiovascular:  Positive for leg swelling.       Objective   Physical Exam  The following elements of a brief ear nose and throat exam were performed: External ear canals and tympanic membranes, external nose and nasal passages, oral cavity, palpation of the neck, percussion of the face, palpation of the thyroid.    There is cerumen impaction bilaterally and this was cleared using speculum and curettes.  The tympanic membranes are retracted and they are moving with the Valsalva maneuver.  There is mild nasal edema and the turbinates are pale.  There is no purulence or facial tenderness noted.  A repeat hearing test today reveals bilateral mixed hearing loss with resolution of the air-bone gap on the right side and improvement of the air-bone gap on the left side.  She does have negative pressure in the middle ear is noted on the tympanograms.    Ear cerumen removal    Date/Time: 2/25/2025 11:22 AM    Performed by: Nakia Goldstein MD  Authorized by: Nakia Goldstein MD    Consent:     Consent obtained:  Verbal    Risks discussed:  Pain  Procedure details:     Location:  L ear and R ear    Procedure type: curette        Assessment/Plan   Diagnoses and all orders for this visit:  Dysfunction of left eustachian tube (Primary)  Non-seasonal allergic rhinitis due to other allergic trigger  -     fluticasone (Flonase) 50 mcg/actuation nasal spray; Administer 2 sprays into each nostril once daily. Shake gently. Before first use, prime pump. After use, clean tip and replace cap.  Sensorineural hearing loss (SNHL) of both ears  Mixed conductive and sensorineural hearing loss of left ear " with restricted hearing of right ear  Bilateral impacted cerumen  -     Ear cerumen removal       1.  Bilateral eustachian tube dysfunction with resolution of the air-bone gap on the right side and improvement of the air-bone gap on the left side.  She was advised to resume performing the Valsalva maneuver on a daily basis.  2.  Chronic allergic rhinitis contributing to the eustachian tube dysfunction mostly controlled with Flonase.  3.  Chronic bilateral sensorineural hearing loss.  She was advised to continue using the hearing aids.  4.  Bilateral cerumen impaction cleared today.  Her prescription was renewed and she will follow-up in 1 year with a repeat hearing test.

## 2025-02-25 NOTE — PROGRESS NOTES
"AUDIOLOGIC EVALUATION    Name:  Karyn Bliss \"Honey\"  :  1934  Age:  89 y.o.    HISTORY:     Patient was seen for recheck of her asymmetric/mixed hearing loss prior to follow-up with Dr. Goldstein.  She is unsure if her hearing has changed, and reported that she still has difficulty in noisy environments.  She denied tinnitus, otalgia, aural pressure/fullness, and otorrhea.    EVALUATION:    See Audiogram.    RESULTS:    Otoscopy:  Right: Clear canal, normal color and appearance of tympanic membrane.  Left: Clear canal, normal color and appearance of tympanic membrane.    Tympanometry:  Right: Normal tympanic membrane mobility with negative middle ear pressure (-110 daPa).  Left: Reduced tympanic membrane mobility with normal middle ear pressure.    Hearing Sensitivity:  Right: Mild through 1000 Hz sloping to severe sensorineural hearing loss.  Left: Severe rising to moderately severe through 2000 Hz, sloping to severe sensorineural hearing loss.  Note air-bone gap at 4000 Hz.    Word Recognition Score (NU-6 3):  Right: Did not test.  Left: Fair (72%) at 90 dBHL.    IMPRESSIONS:    Compared to previous hearing evaluation on 2024, in the right ear air conduction thresholds improved 10-30 dB from 125-1000 Hz, however, there is now negative middle ear pressure present.  In the left ear, air conduction thresholds improved 15 dB from 125-1000 Hz, and improvement in middle ear pressure.     ASSESSMENT AND PLAN:    - Continue medical follow-up with Dr. Goldstein.  - Continue use of amplification and follow-up as recommended with fitting provider for hearing aid maintenance and adjustments.  - Monitor and recheck hearing as warranted.  - Counseled regarding results and recommendations.    IGOR Thorne., B.S.  Audiology Student Extern    Marguerite Iqbal  Clinical Audiologist  "

## 2025-03-12 ENCOUNTER — APPOINTMENT (OUTPATIENT)
Dept: PRIMARY CARE | Facility: CLINIC | Age: OVER 89
End: 2025-03-12
Payer: MEDICARE

## 2025-03-12 VITALS
SYSTOLIC BLOOD PRESSURE: 138 MMHG | OXYGEN SATURATION: 98 % | HEART RATE: 78 BPM | TEMPERATURE: 96.5 F | DIASTOLIC BLOOD PRESSURE: 70 MMHG

## 2025-03-12 DIAGNOSIS — R19.7 DIARRHEA, UNSPECIFIED TYPE: Primary | ICD-10-CM

## 2025-03-12 DIAGNOSIS — K63.8219 SMALL INTESTINAL BACTERIAL OVERGROWTH (SIBO): ICD-10-CM

## 2025-03-12 DIAGNOSIS — G47.9 SLEEP DISTURBANCE: ICD-10-CM

## 2025-03-12 DIAGNOSIS — R53.1 GENERALIZED WEAKNESS: ICD-10-CM

## 2025-03-12 DIAGNOSIS — I10 ESSENTIAL HYPERTENSION, BENIGN: ICD-10-CM

## 2025-03-12 PROCEDURE — 99213 OFFICE O/P EST LOW 20 MIN: CPT | Performed by: FAMILY MEDICINE

## 2025-03-12 PROCEDURE — 3078F DIAST BP <80 MM HG: CPT | Performed by: FAMILY MEDICINE

## 2025-03-12 PROCEDURE — G2211 COMPLEX E/M VISIT ADD ON: HCPCS | Performed by: FAMILY MEDICINE

## 2025-03-12 PROCEDURE — 1159F MED LIST DOCD IN RCRD: CPT | Performed by: FAMILY MEDICINE

## 2025-03-12 PROCEDURE — 1126F AMNT PAIN NOTED NONE PRSNT: CPT | Performed by: FAMILY MEDICINE

## 2025-03-12 PROCEDURE — 3075F SYST BP GE 130 - 139MM HG: CPT | Performed by: FAMILY MEDICINE

## 2025-03-12 RX ORDER — DIPHENOXYLATE HYDROCHLORIDE AND ATROPINE SULFATE 2.5; .025 MG/1; MG/1
1 TABLET ORAL 4 TIMES DAILY PRN
Qty: 180 TABLET | Refills: 0 | Status: SHIPPED | OUTPATIENT
Start: 2025-03-12

## 2025-03-12 ASSESSMENT — PAIN SCALES - GENERAL: PAINLEVEL_OUTOF10: 0-NO PAIN

## 2025-03-12 NOTE — PROGRESS NOTES
"Subjective   Patient ID: Karyn Bliss \"Pranay" is a 89 y.o. female who presents for Follow-up (3 month follow-up).  HPI  1) diarrhea mgmt - still using Lomotil and finding as effective as Imodium and cheaper for her - often can take more than one in a day -no blood in stool or dark stool noted    2) Back to taking Tylenol PM, only 1 tablet - still finds effective to stay asleep or return to sleep - no ill-effects in AM     3) doing Physical Therapy and doing well -was referred for generalized weakness, -will be done with her sessions soon -has felt significant benefit from it    4) Here to follow-up hypertension - denies chest pain, shortness of breath, dizziness, lightheadedness, hand or foot swelling that is new or different.  Taking and tolerating medications well.  Doing well with physical activity.    Review of Systems  Essentially noncontributory otherwise    Objective   /70 (BP Location: Left arm, Patient Position: Sitting, BP Cuff Size: Large adult)   Pulse 78   Temp 35.8 °C (96.5 °F) (Temporal)   SpO2 98%     Physical Exam  General: No acute distress, appears well  HEENT: No conjunctival injection or scleral icterus  Neck: No gross JVD or thyromegaly  Lungs: Clear to auscultation bilaterally no wheeze or crackles  Cardiac: Regular rhythm, normal S1-S2 without S3-S4  Extremities: Good peripheral pulses,  lower extremities w/ slight pitting edema  Neuro: No gross tremors, ambulates well without us of device  Assessment/Plan   Problem List Items Addressed This Visit       Essential hypertension, benign     Other Visit Diagnoses       Diarrhea, unspecified type    -  Primary    Sleep disturbance        Generalized weakness            1) diarrhea mgmt - cpm    2) insomnia issues - cont Tylenol PM     3) gen weakness - sig improvement - complete PT protocol     4) Hypertension - cpm    Follow-up in June  for CPE        "

## 2025-04-18 ENCOUNTER — TELEPHONE (OUTPATIENT)
Dept: PRIMARY CARE | Facility: CLINIC | Age: OVER 89
End: 2025-04-18
Payer: MEDICARE

## 2025-04-18 DIAGNOSIS — J98.8 RTI (RESPIRATORY TRACT INFECTION): Primary | ICD-10-CM

## 2025-04-18 RX ORDER — DOXYCYCLINE 100 MG/1
100 TABLET ORAL 2 TIMES DAILY
Qty: 14 TABLET | Refills: 0 | Status: SHIPPED | OUTPATIENT
Start: 2025-04-18 | End: 2025-04-25

## 2025-04-23 DIAGNOSIS — J22 LRTI (LOWER RESPIRATORY TRACT INFECTION): Primary | ICD-10-CM

## 2025-04-23 RX ORDER — CEFPODOXIME PROXETIL 200 MG/1
200 TABLET, FILM COATED ORAL 2 TIMES DAILY
Qty: 10 TABLET | Refills: 0 | Status: SHIPPED | OUTPATIENT
Start: 2025-04-23 | End: 2025-04-28

## 2025-04-23 NOTE — TELEPHONE ENCOUNTER
Patient is feeling a little better after use of doxycycline x 6 days however, she now feels that it's moving down into her chest.  She notes that she is wheezing a bit before she take a breath in.   Questions if she should be taking something else.     Per discussion with Dr. Rush, patient advised that another RX was sent for cefpodoxime.  She should take this for 5 days along with the doxycycline.   She should also take a probiotic to help protect her stomach.   Patient understood and agreeable to this plan.  She will call if she feels no improvement.

## 2025-04-23 NOTE — PROGRESS NOTES
Called office - getting infection in lungs it seems - will cont Doxy and add     Requested Prescriptions     Signed Prescriptions Disp Refills    cefpodoxime (Vantin) 200 mg tablet 10 tablet 0     Sig: Take 1 tablet (200 mg) by mouth 2 times a day for 5 days.

## 2025-06-04 DIAGNOSIS — Z00.00 HEALTH MAINTENANCE EXAMINATION: Primary | ICD-10-CM

## 2025-06-05 ENCOUNTER — LAB (OUTPATIENT)
Dept: LAB | Facility: HOSPITAL | Age: OVER 89
End: 2025-06-05
Payer: MEDICARE

## 2025-06-05 DIAGNOSIS — Z00.00 ENCOUNTER FOR GENERAL ADULT MEDICAL EXAMINATION WITHOUT ABNORMAL FINDINGS: Primary | ICD-10-CM

## 2025-06-05 LAB
25(OH)D3 SERPL-MCNC: 29 NG/ML (ref 30–100)
ALBUMIN SERPL BCP-MCNC: 4.1 G/DL (ref 3.4–5)
ALP SERPL-CCNC: 120 U/L (ref 33–136)
ALT SERPL W P-5'-P-CCNC: 13 U/L (ref 7–45)
ANION GAP SERPL CALC-SCNC: 13 MMOL/L (ref 10–20)
APPEARANCE UR: CLEAR
AST SERPL W P-5'-P-CCNC: 19 U/L (ref 9–39)
BASOPHILS # BLD AUTO: 0.06 X10*3/UL (ref 0–0.1)
BASOPHILS NFR BLD AUTO: 1 %
BILIRUB SERPL-MCNC: 0.7 MG/DL (ref 0–1.2)
BILIRUB UR STRIP.AUTO-MCNC: NEGATIVE MG/DL
BUN SERPL-MCNC: 23 MG/DL (ref 6–23)
CALCIUM SERPL-MCNC: 9.3 MG/DL (ref 8.6–10.6)
CHLORIDE SERPL-SCNC: 104 MMOL/L (ref 98–107)
CHOLEST SERPL-MCNC: 198 MG/DL (ref 0–199)
CHOLESTEROL/HDL RATIO: 2.9
CO2 SERPL-SCNC: 26 MMOL/L (ref 21–32)
COLOR UR: YELLOW
CREAT SERPL-MCNC: 0.89 MG/DL (ref 0.5–1.05)
CRP SERPL HS-MCNC: 0.4 MG/L
EGFRCR SERPLBLD CKD-EPI 2021: 61 ML/MIN/1.73M*2
EOSINOPHIL # BLD AUTO: 0.39 X10*3/UL (ref 0–0.4)
EOSINOPHIL NFR BLD AUTO: 6.3 %
ERYTHROCYTE [DISTWIDTH] IN BLOOD BY AUTOMATED COUNT: 13.9 % (ref 11.5–14.5)
EST. AVERAGE GLUCOSE BLD GHB EST-MCNC: 105 MG/DL
GLUCOSE SERPL-MCNC: 87 MG/DL (ref 74–99)
GLUCOSE UR STRIP.AUTO-MCNC: NORMAL MG/DL
HBA1C MFR BLD: 5.3 % (ref ?–5.7)
HCT VFR BLD AUTO: 43.6 % (ref 36–46)
HDLC SERPL-MCNC: 67.8 MG/DL
HGB BLD-MCNC: 13.3 G/DL (ref 12–16)
IMM GRANULOCYTES # BLD AUTO: 0.02 X10*3/UL (ref 0–0.5)
IMM GRANULOCYTES NFR BLD AUTO: 0.3 % (ref 0–0.9)
KETONES UR STRIP.AUTO-MCNC: NEGATIVE MG/DL
LDLC SERPL CALC-MCNC: 118 MG/DL
LEUKOCYTE ESTERASE UR QL STRIP.AUTO: NEGATIVE
LYMPHOCYTES # BLD AUTO: 1.81 X10*3/UL (ref 0.8–3)
LYMPHOCYTES NFR BLD AUTO: 29.2 %
MCH RBC QN AUTO: 29.7 PG (ref 26–34)
MCHC RBC AUTO-ENTMCNC: 30.5 G/DL (ref 32–36)
MCV RBC AUTO: 97 FL (ref 80–100)
MONOCYTES # BLD AUTO: 0.82 X10*3/UL (ref 0.05–0.8)
MONOCYTES NFR BLD AUTO: 13.2 %
NEUTROPHILS # BLD AUTO: 3.1 X10*3/UL (ref 1.6–5.5)
NEUTROPHILS NFR BLD AUTO: 50 %
NITRITE UR QL STRIP.AUTO: NEGATIVE
NON HDL CHOLESTEROL: 130 MG/DL (ref 0–149)
NRBC BLD-RTO: 0 /100 WBCS (ref 0–0)
PH UR STRIP.AUTO: 7.5 [PH]
PLATELET # BLD AUTO: 192 X10*3/UL (ref 150–450)
POTASSIUM SERPL-SCNC: 4.3 MMOL/L (ref 3.5–5.3)
PROT SERPL-MCNC: 6.9 G/DL (ref 6.4–8.2)
PROT UR STRIP.AUTO-MCNC: NEGATIVE MG/DL
RBC # BLD AUTO: 4.48 X10*6/UL (ref 4–5.2)
RBC # UR STRIP.AUTO: NEGATIVE MG/DL
SODIUM SERPL-SCNC: 139 MMOL/L (ref 136–145)
SP GR UR STRIP.AUTO: 1.02
TRIGL SERPL-MCNC: 61 MG/DL (ref 0–149)
TSH SERPL-ACNC: 1.61 MIU/L (ref 0.44–3.98)
UROBILINOGEN UR STRIP.AUTO-MCNC: NORMAL MG/DL
VLDL: 12 MG/DL (ref 0–40)
WBC # BLD AUTO: 6.2 X10*3/UL (ref 4.4–11.3)

## 2025-06-05 PROCEDURE — 84443 ASSAY THYROID STIM HORMONE: CPT

## 2025-06-05 PROCEDURE — 83036 HEMOGLOBIN GLYCOSYLATED A1C: CPT

## 2025-06-05 PROCEDURE — 86141 C-REACTIVE PROTEIN HS: CPT

## 2025-06-05 PROCEDURE — 85025 COMPLETE CBC W/AUTO DIFF WBC: CPT

## 2025-06-05 PROCEDURE — 82306 VITAMIN D 25 HYDROXY: CPT

## 2025-06-05 PROCEDURE — 80053 COMPREHEN METABOLIC PANEL: CPT

## 2025-06-05 PROCEDURE — 36415 COLL VENOUS BLD VENIPUNCTURE: CPT

## 2025-06-05 PROCEDURE — 80061 LIPID PANEL: CPT

## 2025-06-05 PROCEDURE — 81003 URINALYSIS AUTO W/O SCOPE: CPT

## 2025-06-06 LAB — HOLD SPECIMEN: NORMAL

## 2025-06-10 DIAGNOSIS — Z00.00 HEALTHCARE MAINTENANCE: ICD-10-CM

## 2025-06-12 ENCOUNTER — APPOINTMENT (OUTPATIENT)
Dept: PRIMARY CARE | Facility: CLINIC | Age: OVER 89
End: 2025-06-12
Payer: MEDICARE

## 2025-06-12 ENCOUNTER — OFFICE VISIT (OUTPATIENT)
Dept: PRIMARY CARE | Facility: CLINIC | Age: OVER 89
End: 2025-06-12
Payer: MEDICARE

## 2025-06-12 VITALS
HEART RATE: 69 BPM | DIASTOLIC BLOOD PRESSURE: 72 MMHG | TEMPERATURE: 98 F | HEIGHT: 67 IN | WEIGHT: 213.38 LBS | OXYGEN SATURATION: 97 % | SYSTOLIC BLOOD PRESSURE: 138 MMHG | BODY MASS INDEX: 33.49 KG/M2

## 2025-06-12 VITALS
OXYGEN SATURATION: 97 % | HEIGHT: 67 IN | DIASTOLIC BLOOD PRESSURE: 72 MMHG | BODY MASS INDEX: 33.49 KG/M2 | HEART RATE: 69 BPM | WEIGHT: 213.38 LBS | TEMPERATURE: 98 F | SYSTOLIC BLOOD PRESSURE: 138 MMHG

## 2025-06-12 DIAGNOSIS — Z12.31 BREAST CANCER SCREENING BY MAMMOGRAM: ICD-10-CM

## 2025-06-12 DIAGNOSIS — E78.00 ELEVATED LDL CHOLESTEROL LEVEL: ICD-10-CM

## 2025-06-12 DIAGNOSIS — R10.9 ABDOMINAL PAIN, UNSPECIFIED ABDOMINAL LOCATION: ICD-10-CM

## 2025-06-12 DIAGNOSIS — I10 ESSENTIAL HYPERTENSION, BENIGN: ICD-10-CM

## 2025-06-12 DIAGNOSIS — R01.1 HEART MURMUR: ICD-10-CM

## 2025-06-12 DIAGNOSIS — H90.5 SENSORINEURAL HEARING LOSS (SNHL) OF RIGHT EAR, UNSPECIFIED HEARING STATUS ON CONTRALATERAL SIDE: ICD-10-CM

## 2025-06-12 DIAGNOSIS — Z00.01 ENCOUNTER FOR GENERAL ADULT MEDICAL EXAMINATION WITH ABNORMAL FINDINGS: Primary | ICD-10-CM

## 2025-06-12 DIAGNOSIS — Z00.00 MEDICARE ANNUAL WELLNESS VISIT, SUBSEQUENT: Primary | ICD-10-CM

## 2025-06-12 DIAGNOSIS — M79.601 RIGHT ARM PAIN: ICD-10-CM

## 2025-06-12 PROCEDURE — 3075F SYST BP GE 130 - 139MM HG: CPT | Performed by: FAMILY MEDICINE

## 2025-06-12 PROCEDURE — G0439 PPPS, SUBSEQ VISIT: HCPCS | Performed by: FAMILY MEDICINE

## 2025-06-12 PROCEDURE — 3078F DIAST BP <80 MM HG: CPT | Performed by: FAMILY MEDICINE

## 2025-06-12 PROCEDURE — 1159F MED LIST DOCD IN RCRD: CPT | Performed by: FAMILY MEDICINE

## 2025-06-12 ASSESSMENT — ENCOUNTER SYMPTOMS
LOSS OF SENSATION IN FEET: 0
DEPRESSION: 0
OCCASIONAL FEELINGS OF UNSTEADINESS: 0

## 2025-06-12 ASSESSMENT — PAIN SCALES - GENERAL: PAINLEVEL_OUTOF10: 0-NO PAIN

## 2025-06-12 NOTE — PROGRESS NOTES
"Karyn Bliss is a 89 year old here for a Medicare Annual Wellness Visit     Health Risk Assessment  In general, health is:  good ('24)  very good ('25)     Concerns with balance:  goes carefully when walks and when turns - has slower pace / walking more carefully - she does not need a walker or cane     Concerns with teeth or dentures:  yes - getting regular care      Concerns with sexual function:  not noted     Felt anxious, stressed, angry, irritable, lonely, isolated, or had thoughts of hurting themself:  not really, has two very attentive dtrs - often takes initiative to reach out      Has little interest or pleasure in doing things:  being with people, 2 book groups, some theater, plays [employer] - intends to get to [facility] more this next year      Bothered by feeling down, depressed, or hopeless:  not currently - can be up and down a bit     Needs help with grocery shopping, cooking, housework, bathing, grooming, dressing, eating, sitting or standing, walking, using the toilet, handling finances, taking medications, using the telephone, or driving:  no     Following safety precautions in the home environment and vehicle: removed throw rugs from floors, installed grab bars in the bathroom, handrails in stairwells, having adequate lighting, wearing seatbelt at all times?  yes     Smokes cigarettes, vapes, or chew tobacco:  no     Eats healthy foods including fruits, vegetables, whole grains, and fiber-rich foods:  \"I love carbs\" (fruit, breads, ice cream) - has not really expanded her diet      Number of days per week engages in exercise:  really just does stairs     Average alcohol consumption: very rare         Current Providers  Specialists: I have reviewed specialist-related care of the patient in the medical record.  Derm: Morena  Ophtho: Roman  DPM: Cheryl (in Candis's office)  Audiology: Toro KAY: Bautista  DDS: Cinda (sp?)   Periodontist: Kenya        Medical/Family history review  Reviewed and updated " problem list, medical/surgical/family/social history, medications, and allergies.     Opioid use review  Patient use of opioids:  0     Depression screening  Depression Screening PHQ-2 Score   Today 0       Cognitive screening  Mini Cog Score:  5/5     Cognitive screening reviewed and plan:  not indicated     Functional Observation  Was the patient's timed Up & Go test unsteady or >= 12 seconds?  no     Advance Care Planning  End of Life planning discussed, including patient's advanced directive wishes:  Has documents     Vision and Hearing assessment  Visual acuity (required for Welcome to Medicare):  up to date  Hearing Evaluation:  done this past Feb:   Compared to previous hearing evaluation on 6/18/2024, in the right ear air conduction thresholds improved 10-30 dB from 125-1000 Hz, however, there is now negative middle ear pressure present.  In the left ear, air conduction thresholds improved 15 dB from 125-1000 Hz, and improvement in middle ear pressure.  **Bone conduction thresholds not completed today, thresholds are from 6/18/2024.     HPI  1) did have some hearing loss left side - getting followed by Audio and Dr Goldstein - seems to be better     2) still gets occasional pain on her right side   This is not really limiting   Question if this is really muscular as had scan per GI which was not really revealing  Occasionally can she can get the pain with a bowel movement, but this is overall not worsening  Has continued to use align after antibiotic treatment, feels it made a big difference and abdominal symptoms and plans to continue to use for now     3) waxing/waning issues with right arm  In past more connected with her shoulder, more recently was right elbow/forearm that was really limiting for her  At times has difficulty sleeping on her right arm  No arm weakness with associated weakness     4) Here to follow-up hypertension and elev LDL today (similar TC:HDL ratio and low hsCRP - denies chest pain,  "shortness of breath, dizziness, lightheadedness -left lower leg has been a bit swollen lately but has not been using the diuretic.  Taking and tolerating medications well.  Doing well with limited physical activity - stairs     5) preventive care  Vaccines due: COVID / Flu this fall  Mammography: to get this year   Bone density: will do next year (osteopenia)     Review of Systems  Essentially noncontributory otherwise    Objective   /72 (BP Location: Left arm, Patient Position: Sitting, BP Cuff Size: Adult)   Pulse 69   Temp 36.7 °C (98 °F)   Ht 1.699 m (5' 6.9\")   Wt 96.8 kg (213 lb 6 oz)   SpO2 97%   BMI 33.52 kg/m² '  Physical Exam  Vitals and nursing note reviewed.   Constitutional:       General: She is not in acute distress.     Appearance: She is not ill-appearing.   HENT:      Head: Normocephalic and atraumatic.      Right Ear: Tympanic membrane normal.      Left Ear: Tympanic membrane normal.      Mouth/Throat:      Pharynx: No posterior oropharyngeal erythema.   Eyes:      General: No scleral icterus.     Extraocular Movements: Extraocular movements intact.      Pupils: Pupils not equal - R irregular - L does respond to light   Cardiovascular:      Rate and Rhythm: Normal rate and regular rhythm.      Heart sounds: RRR,nl s1/s2, no s3/s4 - II-VI CESAR type murmur heard previously  Pulmonary:      Breath sounds: No wheezing.   Abdominal:      General: There is no distension.      Palpations: Abdomen is soft.      Tenderness: There is no abdominal tenderness.   Musculoskeletal:         General: Normal range of motion.      Right lower leg: No edema.      Left lower leg: Mild non-pitting edema.   Lymphadenopathy:      Cervical: No cervical adenopathy.   Skin:     General: Skin is warm and dry.   Neurological:      Mental Status: She is alert and oriented to person, place, and time. Mental status is at baseline.      Motor: No weakness.      Coordination: Coordination normal.      Deep Tendon " Reflexes: Reflexes normal.   Psychiatric:         Mood and Affect: Mood normal.         Behavior: Behavior normal.         Thought Content: Thought content normal.         Judgment: Judgment normal.  Assessment/Plan   Problem List Items Addressed This Visit       Essential hypertension, benign     Other Visit Diagnoses         Encounter for general adult medical examination with abnormal findings    -  Primary      Sensorineural hearing loss (SNHL) of right ear, unspecified hearing status on contralateral side          Abdominal pain, unspecified abdominal location          Right arm pain          Elevated LDL cholesterol level          Breast cancer screening by mammogram        Relevant Orders    BI mammo bilateral screening tomosynthesis        1) did have some hearing loss left side - cpm - has follow-up 2026     2) still gets occasional pain on her right side - cont Align     3) waxing/waning issues with right arm - follow     4) HTN/elev LDL - heart murmur - follow     5) preventive care  Vaccines due: COVID / Flu this fall  Mammography: to get this year   Bone density: will do next year (osteopenia)

## 2025-06-12 NOTE — PROGRESS NOTES
"Subjective   Patient ID: Karyn Bliss \"Pranay" is a 89 y.o. female who presents for Annual Exam ( [facility]).  HPI  1) did have some hearing loss left side - getting followed by Audio and Dr Goldstein - seems to be better     2) still gets occasional pain on her right side   This is not really limiting   Question if this is really muscular as had scan per GI which was not really revealing  Occasionally can she can get the pain with a bowel movement, but this is overall not worsening  Has continued to use align after antibiotic treatment, feels it made a big difference and abdominal symptoms and plans to continue to use for now     3) waxing/waning issues with right arm  In past more connected with her shoulder, more recently was right elbow/forearm that was really limiting for her  At times has difficulty sleeping on her right arm  No arm weakness with associated weakness     4) Here to follow-up hypertension and elev LDL today (similar TC:HDL ratio and low hsCRP - denies chest pain, shortness of breath, dizziness, lightheadedness -left lower leg has been a bit swollen lately but has not been using the diuretic.  Taking and tolerating medications well.  Doing well with limited physical activity - stairs     5) preventive care  Vaccines due: COVID / Flu this fall  Mammography: to get this year   Bone density: will do next year (osteopenia)     Review of Systems  Essentially noncontributory otherwise    Objective   /72 (BP Location: Left arm, Patient Position: Sitting, BP Cuff Size: Adult)   Pulse 69   Temp 36.7 °C (98 °F)   Ht 1.699 m (5' 6.9\")   Wt 96.8 kg (213 lb 6 oz)   SpO2 97%   BMI 33.52 kg/m² '  Physical Exam  Vitals and nursing note reviewed.   Constitutional:       General: She is not in acute distress.     Appearance: She is not ill-appearing.   HENT:      Head: Normocephalic and atraumatic.      Right Ear: Tympanic membrane normal.      Left Ear: Tympanic membrane normal.      Mouth/Throat: "      Pharynx: No posterior oropharyngeal erythema.   Eyes:      General: No scleral icterus.     Extraocular Movements: Extraocular movements intact.      Pupils: Pupils not equal - R irregular - L does respond to light   Cardiovascular:      Rate and Rhythm: Normal rate and regular rhythm.      Heart sounds: RRR,nl s1/s2, no s3/s4 - II-VI CESAR type murmur heard previously  Pulmonary:      Breath sounds: No wheezing.   Abdominal:      General: There is no distension.      Palpations: Abdomen is soft.      Tenderness: There is no abdominal tenderness.   Musculoskeletal:         General: Normal range of motion.      Right lower leg: No edema.      Left lower leg: Mild non-pitting edema.   Lymphadenopathy:      Cervical: No cervical adenopathy.   Skin:     General: Skin is warm and dry.   Neurological:      Mental Status: She is alert and oriented to person, place, and time. Mental status is at baseline.      Motor: No weakness.      Coordination: Coordination normal.      Deep Tendon Reflexes: Reflexes normal.   Psychiatric:         Mood and Affect: Mood normal.         Behavior: Behavior normal.         Thought Content: Thought content normal.         Judgment: Judgment normal.  Assessment/Plan   Problem List Items Addressed This Visit       Essential hypertension, benign     Other Visit Diagnoses         Encounter for general adult medical examination with abnormal findings    -  Primary      Sensorineural hearing loss (SNHL) of right ear, unspecified hearing status on contralateral side          Abdominal pain, unspecified abdominal location          Right arm pain          Elevated LDL cholesterol level          Breast cancer screening by mammogram        Relevant Orders    BI mammo bilateral screening tomosynthesis        1) did have some hearing loss left side - cpm - has follow-up 2026     2) still gets occasional pain on her right side - cont Align     3) waxing/waning issues with right arm - follow     4)  HTN/elev LDL - heart murmur - follow     5) preventive care  Vaccines due: COVID / Flu this fall  Mammography: to get this year   Bone density: will do next year (osteopenia)

## 2025-06-25 ENCOUNTER — APPOINTMENT (OUTPATIENT)
Dept: RADIOLOGY | Facility: CLINIC | Age: OVER 89
End: 2025-06-25
Payer: MEDICARE

## 2025-06-26 ENCOUNTER — HOSPITAL ENCOUNTER (OUTPATIENT)
Dept: RADIOLOGY | Facility: CLINIC | Age: OVER 89
Discharge: HOME | End: 2025-06-26
Payer: MEDICARE

## 2025-06-26 VITALS — WEIGHT: 213.38 LBS | BODY MASS INDEX: 33.49 KG/M2 | HEIGHT: 67 IN

## 2025-06-26 DIAGNOSIS — Z12.31 BREAST CANCER SCREENING BY MAMMOGRAM: ICD-10-CM

## 2025-06-26 PROCEDURE — 77067 SCR MAMMO BI INCL CAD: CPT

## 2025-07-09 ENCOUNTER — HOSPITAL ENCOUNTER (OUTPATIENT)
Dept: RADIOLOGY | Facility: EXTERNAL LOCATION | Age: OVER 89
Discharge: HOME | End: 2025-07-09

## 2025-07-10 ENCOUNTER — TELEPHONE (OUTPATIENT)
Dept: PRIMARY CARE | Facility: CLINIC | Age: OVER 89
End: 2025-07-10
Payer: MEDICARE

## 2025-07-10 DIAGNOSIS — R92.1 BREAST CALCIFICATIONS ON MAMMOGRAM: ICD-10-CM

## 2025-07-10 DIAGNOSIS — R92.8 ABNORMAL MAMMOGRAM OF RIGHT BREAST: Primary | ICD-10-CM

## 2025-07-15 ENCOUNTER — HOSPITAL ENCOUNTER (OUTPATIENT)
Dept: RADIOLOGY | Facility: CLINIC | Age: OVER 89
Discharge: HOME | End: 2025-07-15
Payer: MEDICARE

## 2025-07-15 DIAGNOSIS — R92.1 BREAST CALCIFICATIONS ON MAMMOGRAM: ICD-10-CM

## 2025-07-15 DIAGNOSIS — R92.8 ABNORMAL MAMMOGRAM OF RIGHT BREAST: ICD-10-CM

## 2025-07-15 PROCEDURE — 77065 DX MAMMO INCL CAD UNI: CPT | Mod: RIGHT SIDE | Performed by: STUDENT IN AN ORGANIZED HEALTH CARE EDUCATION/TRAINING PROGRAM

## 2025-07-15 PROCEDURE — 77065 DX MAMMO INCL CAD UNI: CPT | Mod: RT

## 2025-09-11 ENCOUNTER — APPOINTMENT (OUTPATIENT)
Dept: PRIMARY CARE | Facility: CLINIC | Age: OVER 89
End: 2025-09-11
Payer: MEDICARE

## 2026-02-24 ENCOUNTER — APPOINTMENT (OUTPATIENT)
Dept: AUDIOLOGY | Facility: CLINIC | Age: OVER 89
End: 2026-02-24
Payer: MEDICARE

## 2026-02-24 ENCOUNTER — APPOINTMENT (OUTPATIENT)
Dept: OTOLARYNGOLOGY | Facility: CLINIC | Age: OVER 89
End: 2026-02-24
Payer: MEDICARE